# Patient Record
Sex: FEMALE | Race: WHITE | NOT HISPANIC OR LATINO | Employment: OTHER | URBAN - METROPOLITAN AREA
[De-identification: names, ages, dates, MRNs, and addresses within clinical notes are randomized per-mention and may not be internally consistent; named-entity substitution may affect disease eponyms.]

---

## 2019-03-23 ENCOUNTER — OFFICE VISIT (OUTPATIENT)
Dept: URGENT CARE | Facility: URGENT CARE | Age: 54
End: 2019-03-23
Payer: COMMERCIAL

## 2019-03-23 VITALS
RESPIRATION RATE: 16 BRPM | WEIGHT: 111 LBS | TEMPERATURE: 98.1 F | HEART RATE: 88 BPM | SYSTOLIC BLOOD PRESSURE: 100 MMHG | DIASTOLIC BLOOD PRESSURE: 66 MMHG

## 2019-03-23 DIAGNOSIS — L03.012 ACUTE PARONYCHIA OF FINGER OF LEFT HAND: Primary | ICD-10-CM

## 2019-03-23 DIAGNOSIS — L02.91 ABSCESS: ICD-10-CM

## 2019-03-23 PROCEDURE — 99203 OFFICE O/P NEW LOW 30 MIN: CPT | Mod: 25 | Performed by: PHYSICIAN ASSISTANT

## 2019-03-23 PROCEDURE — 10060 I&D ABSCESS SIMPLE/SINGLE: CPT | Performed by: PHYSICIAN ASSISTANT

## 2019-03-23 RX ORDER — CEPHALEXIN 500 MG/1
500 CAPSULE ORAL 3 TIMES DAILY
Qty: 30 CAPSULE | Refills: 0 | Status: SHIPPED | OUTPATIENT
Start: 2019-03-23 | End: 2024-07-25

## 2019-03-23 NOTE — PATIENT INSTRUCTIONS
Patient Education     Paronychia of the Finger or Toe  Paronychia is an infection near a fingernail or toenail. It usually occurs when an opening in the cuticle or an ingrown toenail lets bacteria under the skin.  The infection will need to be drained if pus is present. If the infection has been caught early, you may need only antibiotic treatment. Healing will take about 1 to 2 weeks.  Home care  Follow these guidelines when caring for yourself at home:    Clean and soak the toe or finger. Do this 2 times a day for the first 3 days. To do so:  ? Soak your foot or hand in a tub of warm water for 5 minutes. Or hold your toe or finger under a faucet of warm running water for 5 minutes.  ? Clean any crust away with soap and water using a cotton swab.  ? Put antibiotic ointment on the infected area.    Change the dressing daily or any time it gets dirty.    If you were given antibiotics, take them as directed until they are all gone.    If your infection is on a toe, wear comfortable shoes with a lot of toe room. You can also wear open-toed sandals while your toe heals.    You may use over-the-counter medicine (acetaminophen or ibuprofen to help with pain, unless another medicine was prescribed. If you have chronic liver or kidney disease, talk with your healthcare provider before using these medicines. Also talk with your provider if you've had a stomach ulcer or GI (gastrointestinal) bleeding.  Prevention  The following can prevent paronychia:    Avoid cutting or playing with your cuticles at home.    Don't bite your nails.    Don't suck on your thumbs or fingers.  Follow-up care  Follow up with your healthcare provider, or as advised.  When to seek medical advice  Call your healthcare provider right away if any of these occur:    Redness, pain, or swelling of the finger or toe gets worse    Red streaks in the skin leading away from the wound    Pus or fluid draining from the nail area    Fever of 100.4 F (38 C) or  higher, or as directed by your provider  Date Last Reviewed: 8/1/2016 2000-2018 [employer]. [address] [place] [address], [place] [address]. All rights reserved. This information is not intended as a substitute for professional medical care. Always follow your healthcare professional's instructions.

## 2019-03-28 NOTE — PROGRESS NOTES
SUBJECTIVE:  Chief Complaint   Patient presents with     Finger     rt 3rd finger injury aprox 10 days ago,still with pain     Nora Vargas is a 53 year old female presents with a chief complaint of left finger  third swelling, tenderness and redness.  How: no known injury.  The patient complained of mild pain  and has not had decreased ROM.  Pain exacerbated by movement.  Relieved by rest.  She treated it initially with no therapy. This is the first time this type of injury has occurred to this patient.     No past medical history on file.  No Known Allergies  Social History     Tobacco Use     Smoking status: Never Smoker     Smokeless tobacco: Never Used   Substance Use Topics     Alcohol use: Not on file       ROS:  CONSTITUTIONAL:NEGATIVE for fever, chills, change in weight  INTEGUMENTARY/SKIN: POSITIVE for right 3rd finger swelling, tenderness  RESP:NEGATIVE for significant cough or SOB  CV: NEGATIVE for chest pain, palpitations or peripheral edema  MUSCULOSKELETAL: POSITIVE for right 3rd finger tenderness, localized swelling  VASC: NEGATIVE for localized swelling, localized puss pocket  NEURO: NEGATIVE for weakness, dizziness or paresthesias    EXAM:   /66 (Cuff Size: Adult Regular)   Pulse 88   Temp 98.1  F (36.7  C) (Oral)   Resp 16   Wt 50.3 kg (111 lb)   Gen: healthy,alert,no distress  Extremity: finger has localized swelling and tenderness.   There is not compromise to the distal circulation.  Pulses are +2 and CRT is brisk  EXTREMITIES: peripheral pulses normal  MS:  Positive for finger tenderness  SKIN: Positive for localized puss pocket  NEURO: Normal strength and tone, sensory exam grossly normal, mentation intact and speech normal    ASSESSMENT/PLAN      ICD-10-CM    1. Acute paronychia of finger of left hand L03.012 cephALEXin (KEFLEX) 500 MG capsule     DRAIN SKIN ABSCESS SIMPLE/SINGLE   2. Abscess L02.91      Warm finger soaks  Motrin  Keflex for infection    PROCEDURE:  11 blade used  to make incision into finger  Drainage of puss  Pt tolerated procedure well

## 2021-05-26 ENCOUNTER — RECORDS - HEALTHEAST (OUTPATIENT)
Dept: ADMINISTRATIVE | Facility: CLINIC | Age: 56
End: 2021-05-26

## 2022-03-01 ENCOUNTER — TRANSFERRED RECORDS (OUTPATIENT)
Dept: MULTI SPECIALTY CLINIC | Facility: CLINIC | Age: 57
End: 2022-03-01

## 2022-03-01 LAB — PAP SMEAR - HIM PATIENT REPORTED: NORMAL

## 2024-07-24 ENCOUNTER — TELEPHONE (OUTPATIENT)
Dept: FAMILY MEDICINE | Facility: CLINIC | Age: 59
End: 2024-07-24

## 2024-07-24 ENCOUNTER — OFFICE VISIT (OUTPATIENT)
Dept: FAMILY MEDICINE | Facility: CLINIC | Age: 59
End: 2024-07-24
Payer: COMMERCIAL

## 2024-07-24 VITALS
DIASTOLIC BLOOD PRESSURE: 74 MMHG | TEMPERATURE: 97.8 F | OXYGEN SATURATION: 97 % | HEART RATE: 70 BPM | RESPIRATION RATE: 15 BRPM | SYSTOLIC BLOOD PRESSURE: 110 MMHG

## 2024-07-24 DIAGNOSIS — N20.0 KIDNEY STONE: ICD-10-CM

## 2024-07-24 DIAGNOSIS — G47.09 OTHER INSOMNIA: Primary | ICD-10-CM

## 2024-07-24 DIAGNOSIS — K63.8219 SMALL INTESTINAL BACTERIAL OVERGROWTH (SIBO): ICD-10-CM

## 2024-07-24 DIAGNOSIS — M81.8 OTHER OSTEOPOROSIS WITHOUT CURRENT PATHOLOGICAL FRACTURE: ICD-10-CM

## 2024-07-24 DIAGNOSIS — Z99.3 WHEELCHAIR DEPENDENT: ICD-10-CM

## 2024-07-24 DIAGNOSIS — E03.9 HYPOTHYROIDISM, UNSPECIFIED TYPE: ICD-10-CM

## 2024-07-24 DIAGNOSIS — G93.32 CHRONIC FATIGUE SYNDROME: ICD-10-CM

## 2024-07-24 LAB
T3FREE SERPL-MCNC: 2.4 PG/ML (ref 2–4.4)
T4 FREE SERPL-MCNC: 1.52 NG/DL (ref 0.9–1.7)
TSH SERPL DL<=0.005 MIU/L-ACNC: 3.38 UIU/ML (ref 0.3–4.2)

## 2024-07-24 PROCEDURE — 84481 FREE ASSAY (FT-3): CPT | Performed by: PHYSICIAN ASSISTANT

## 2024-07-24 PROCEDURE — 84439 ASSAY OF FREE THYROXINE: CPT | Performed by: PHYSICIAN ASSISTANT

## 2024-07-24 PROCEDURE — 90471 IMMUNIZATION ADMIN: CPT | Performed by: PHYSICIAN ASSISTANT

## 2024-07-24 PROCEDURE — 90715 TDAP VACCINE 7 YRS/> IM: CPT | Performed by: PHYSICIAN ASSISTANT

## 2024-07-24 PROCEDURE — 99204 OFFICE O/P NEW MOD 45 MIN: CPT | Mod: 25 | Performed by: PHYSICIAN ASSISTANT

## 2024-07-24 PROCEDURE — 36415 COLL VENOUS BLD VENIPUNCTURE: CPT | Performed by: PHYSICIAN ASSISTANT

## 2024-07-24 PROCEDURE — 84443 ASSAY THYROID STIM HORMONE: CPT | Performed by: PHYSICIAN ASSISTANT

## 2024-07-24 RX ORDER — LEVOTHYROXINE SODIUM 50 UG/1
TABLET ORAL
Qty: 90 TABLET | Refills: 3 | Status: SHIPPED | OUTPATIENT
Start: 2024-07-24

## 2024-07-24 RX ORDER — TRAZODONE HYDROCHLORIDE 50 MG/1
150 TABLET, FILM COATED ORAL AT BEDTIME
Qty: 270 TABLET | Refills: 1 | Status: SHIPPED | OUTPATIENT
Start: 2024-07-24 | End: 2024-07-29

## 2024-07-24 ASSESSMENT — PAIN SCALES - GENERAL: PAINLEVEL: NO PAIN (0)

## 2024-07-24 NOTE — PROGRESS NOTES
Assessment & Plan     (G47.09) Other insomnia  (primary encounter diagnosis)  Comment: refilled.   Plan: traZODone (DESYREL) 50 MG tablet            (E03.9) Hypothyroidism, unspecified type  Comment: await labs. Referral placed.   Plan: TSH, T4, free, T3, Free, levothyroxine         (SYNTHROID/LEVOTHROID) 50 MCG tablet, Adult         Endocrinology  Referral            (M81.8) Other osteoporosis without current pathological fracture  Comment: referral placed.   Plan: Adult Endocrinology  Referral, Primary        Care - Care Coordination Referral            (K63.8219) Small intestinal bacterial overgrowth (SIBO)  Comment: referral to [facility] will be faxed with OV notes. Needs to establish care.   Plan: Adult GI  Referral - Consult Only            (N20.0) Kidney stone  Comment: referral placed. No symptoms.   Plan: Adult Urology  Referral            (Z99.3) Wheelchair dependent  Comment: CCSW referral for resources.  pt may drop off form for [employer] as well with Noreen Galdamez PA-C   Form for disabled parking given. pt does not drive.   Plan: Primary Care - Care Coordination Referral            (G93.32) Chronic fatigue syndrome  Comment:   Plan: will continue care with Dr. Momo Almazan   Nora is a 58 year old, presenting for the following health issues:  Medication Request        7/24/2024     7:49 AM   Additional Questions   Roomed by Francisca Benjamin     Via the Health Maintenance questionnaire, the patient has reported the following services have been completed -Cervical Cancer Screening: Mony SUNG 2022-03-01, this information has been sent to the abstraction team.  History of Present Illness       Reason for visit:  New patient    She eats 4 or more servings of fruits and vegetables daily.She consumes 0 sweetened beverage(s) daily.She exercises with enough effort to increase her heart rate 9 or less minutes per day.  She exercises with enough effort to  increase her heart rate 3 or less days per week. She is missing 2 dose(s) of medications per week.       Patient is here to establish care.  She has recently moved from [place] back to [place].  She is currently under the care of Dr. Barr, who is a chronic fatigue syndrome specialist.    Patient was seeing endocrinology in [place] for her hypothyroidism as well as osteoporosis management.  She has history of using Reclast with good results.  Request referral today for endocrinology.    Patient also still has a history of small intestine bacterial overgrowth she has been treated by gastroenterology in the past but needs to establish care with new gastroenterologist.  She is currently taking famotidine 20 mg twice daily OTC.    Patient also has a history of kidney stones is taking potassium citrate for this.  Needs to establish care with new urologist.  Requesting referral today.    Patient has a history of ongoing insomnia she uses trazodone with good results.  She is requesting refill of this today.    Patient is wheelchair dependent due to severe osteoporosis and chronic fatigue syndrome.  She is requesting updated disabled parking placard for car.  She may also need to help getting set up with [employer] and looking for other transportation resources.    Hypothyroidism Follow-up    Since last visit, patient describes the following symptoms: Weight stable, no hair loss, no skin changes, no constipation, no loose stools, dry skin, tremors, and fatigue            Review of Systems  Constitutional, HEENT, cardiovascular, pulmonary, gi and gu systems are negative, except as otherwise noted.      Objective    /74 (BP Location: Right arm, Patient Position: Sitting, Cuff Size: Adult Regular)   Pulse 70   Temp 97.8  F (36.6  C) (Oral)   Resp 15   LMP  (LMP Unknown)   SpO2 97%   Breastfeeding No   There is no height or weight on file to calculate BMI.  Physical Exam     GENERAL: alert and no  distress  RESP: lungs clear to auscultation - no rales, rhonchi or wheezes  CV: regular rate and rhythm, normal S1 S2, no S3 or S4, no murmur, click or rub, no peripheral edema   PSYCH: mentation appears normal, affect normal/bright            Signed Electronically by: Noreen Galdamez PA-C

## 2024-07-25 ENCOUNTER — TELEPHONE (OUTPATIENT)
Dept: FAMILY MEDICINE | Facility: CLINIC | Age: 59
End: 2024-07-25
Payer: COMMERCIAL

## 2024-07-25 PROBLEM — D50.9 IRON DEFICIENCY ANEMIA: Status: ACTIVE | Noted: 2020-06-12

## 2024-07-25 PROBLEM — E03.9 HYPOTHYROIDISM, UNSPECIFIED TYPE: Status: ACTIVE | Noted: 2024-07-25

## 2024-07-25 PROBLEM — G93.32 CHRONIC FATIGUE SYNDROME: Status: ACTIVE | Noted: 2022-04-25

## 2024-07-25 PROBLEM — G47.00 INSOMNIA: Status: ACTIVE | Noted: 2022-07-14

## 2024-07-25 PROBLEM — E53.8 COBALAMIN DEFICIENCY: Status: ACTIVE | Noted: 2021-02-18

## 2024-07-25 PROBLEM — E78.1 HYPERTRIGLYCERIDEMIA: Status: ACTIVE | Noted: 2022-11-05

## 2024-07-25 PROBLEM — J45.909 ASTHMA: Status: ACTIVE | Noted: 2022-12-05

## 2024-07-25 PROBLEM — M62.81 MUSCLE WEAKNESS: Status: ACTIVE | Noted: 2023-04-20

## 2024-07-25 PROBLEM — E06.3 HASHIMOTO'S THYROIDITIS: Status: ACTIVE | Noted: 2022-07-14

## 2024-07-25 PROBLEM — R14.0 ABDOMINAL BLOATING: Status: ACTIVE | Noted: 2018-02-14

## 2024-07-25 PROBLEM — D89.40 MAST CELL ACTIVATION SYNDROME (H): Status: ACTIVE | Noted: 2024-07-25

## 2024-07-25 PROBLEM — R76.8 AUTOANTIBODY TITER POSITIVE: Status: ACTIVE | Noted: 2018-10-19

## 2024-07-25 PROBLEM — K63.8219 SMALL INTESTINAL BACTERIAL OVERGROWTH (SIBO): Status: ACTIVE | Noted: 2024-07-25

## 2024-07-25 PROBLEM — M81.8 OTHER OSTEOPOROSIS WITHOUT CURRENT PATHOLOGICAL FRACTURE: Status: ACTIVE | Noted: 2024-07-25

## 2024-07-25 PROBLEM — K57.90 DIVERTICULAR DISEASE: Status: ACTIVE | Noted: 2017-12-07

## 2024-07-25 PROBLEM — E55.9 VITAMIN D DEFICIENCY: Status: ACTIVE | Noted: 2021-02-18

## 2024-07-25 PROBLEM — N20.0 KIDNEY STONE: Status: ACTIVE | Noted: 2017-12-20

## 2024-07-25 PROBLEM — Z99.3 WHEELCHAIR DEPENDENT: Status: ACTIVE | Noted: 2024-07-25

## 2024-07-25 RX ORDER — ZOLEDRONIC ACID 5 MG/100ML
5 INJECTION, SOLUTION INTRAVENOUS
COMMUNITY
Start: 2024-07-25

## 2024-07-25 RX ORDER — ALBUTEROL SULFATE 90 UG/1
1-2 AEROSOL, METERED RESPIRATORY (INHALATION) EVERY 4 HOURS PRN
COMMUNITY
Start: 2024-07-25

## 2024-07-25 RX ORDER — MILK THISTLE 150 MG
CAPSULE ORAL
COMMUNITY

## 2024-07-25 RX ORDER — POTASSIUM CITRATE 10 MEQ/1
2 TABLET, EXTENDED RELEASE ORAL 2 TIMES DAILY
COMMUNITY
End: 2024-09-10

## 2024-07-25 RX ORDER — TANNIC/CHESTNUT/PEPPERMINT 275 MG
CAPSULE ORAL
COMMUNITY
Start: 2024-07-25

## 2024-07-25 RX ORDER — FAMOTIDINE 20 MG/1
20 TABLET, FILM COATED ORAL 2 TIMES DAILY
COMMUNITY
Start: 2024-07-25

## 2024-07-25 RX ORDER — BUDESONIDE AND FORMOTEROL FUMARATE DIHYDRATE 160; 4.5 UG/1; UG/1
2 AEROSOL RESPIRATORY (INHALATION)
COMMUNITY
Start: 2024-07-25 | End: 2024-08-14

## 2024-07-25 NOTE — TELEPHONE ENCOUNTER
Incoming call from patient. Notified Patient of Noreen Galdamez PA-C's message: Your thyroid levels were normal.     Person was given an opportunity to ask questions, verbalized understanding of plan, and is agreeable.    Odilia Lau RN on 7/25/2024 at 10:00 AM

## 2024-07-25 NOTE — TELEPHONE ENCOUNTER
----- Message from Noreen Galdamez sent at 7/25/2024  9:00 AM CDT -----  Nora,    Your thyroid levels were normal.     Take Care,   Noreen Galdamez PA-C

## 2024-07-25 NOTE — TELEPHONE ENCOUNTER
Attempt x 1. Called pt and left a message to call back to [phone] and to ask to speak to a triage nurse.     When pt calls back,     Relay message from Noreen Galdamez PA-C below.     Isha ALSTON RN   [facility]

## 2024-07-26 ENCOUNTER — PATIENT OUTREACH (OUTPATIENT)
Dept: CARE COORDINATION | Facility: CLINIC | Age: 59
End: 2024-07-26
Payer: COMMERCIAL

## 2024-07-26 NOTE — PROGRESS NOTES
Clinic Care Coordination Contact  Community Health Worker Initial Outreach    CHW introduced self and role with CC  Patient states that her PCP did complete the handicap parking form already, patient feels confident with complete [employer] application on her own. Will drop them off at the clinic when her section is completed.  CHW inquired if patient is in need of any additional support or resources however patient declines.  CHW provided contact information and encouraged patient to reach out with any future needs or concerns, patient agrees.    Patient accepts CC: No, patient declines ongoing needs from CC at this time. Patient will be sent Care Coordination introduction letter for future reference.     MARCELLA James, B.S. [facility] Care Coordination  [facility]:  [place], [place] and [place]  [phone]  [email or web address]

## 2024-07-26 NOTE — LETTER
[facility] CARE COORDINATION  [id number]  [facility] [address]    July 26, 2024    Nora Vargas  [address] [place] [address]      Dear Nora,    I am a clinic community health worker who works with Noreen Galdamez PA-C with the [facility]. I wanted to thank you for spending the time to talk with me.  Below is a description of clinic care coordination and how I can further assist you.       The clinic care coordination team is made up of a registered nurse, , financial resource worker and community health worker who understand the health care system. The goal of clinic care coordination is to help you manage your health and improve access to the health care system. Our team works alongside your provider to assist you in determining your health and social needs. We can help you obtain health care and community resources, providing you with necessary information and education. We can work with you through any barriers and develop a care plan that helps coordinate and strengthen the communication between you and your care team.  Our services are voluntary and are offered without charge to you personally.    Please feel free to contact me with any questions or concerns regarding care coordination and what we can offer.      We are focused on providing you with the highest-quality healthcare experience possible.    Sincerely,     Rosario Templeton, MARCELLA, B.S. [facility]  Clinic Care Coordination  [facility]:  [place], [place] and [place]  [phone]  [email or web address]

## 2024-07-28 NOTE — TELEPHONE ENCOUNTER
PA Initiation    Medication: TRAZODONE HCL 50 MG PO TABS  Insurance Company:    Pharmacy Filling the Rx: [id number] - [place], [place] - [address]  Filling Pharmacy Phone: [phone]  Filling Pharmacy Fax:    Start Date: 7/28/2024    SENDING BACK TO CLINIC - Is patient able to take the 150 mg capsule of Trazodone? If so please send new rx to pharmacy. If not please route this back to me so I can complete PA request thanks.     Oriana Plata Prior Authorization Team  P: [phone]

## 2024-07-29 RX ORDER — TRAZODONE HYDROCHLORIDE 150 MG/1
150 TABLET ORAL AT BEDTIME
Qty: 90 TABLET | Refills: 1 | Status: SHIPPED | OUTPATIENT
Start: 2024-07-29

## 2024-07-29 NOTE — TELEPHONE ENCOUNTER
See below.  Please advise.  Manisha Oh, RN       SENDING BACK TO CLINIC - Is patient able to take the 150 mg capsule of Trazodone? If so please send new rx to pharmacy. If not please route this back to me so I can complete PA request thanks.

## 2024-07-29 NOTE — TELEPHONE ENCOUNTER
Trazodone 150 mg tablets sent for patient (adjusted from three 50 mg tablets to just the one nightly). Please let her know.

## 2024-07-29 NOTE — TELEPHONE ENCOUNTER
RN spoke to patient     Advised new dose of trazodone 150 mg sent   This will replace the 50 mg - take 3 tablets at bedtime to equal 150 mg   RN reminded patient to take only 1 tablet of the 150 mg dose NOT 3 tablets as previously taking, patient states understanding     Isabelle Melo, Registered Nurse  [facility]

## 2024-07-30 ENCOUNTER — TRANSFERRED RECORDS (OUTPATIENT)
Dept: HEALTH INFORMATION MANAGEMENT | Facility: CLINIC | Age: 59
End: 2024-07-30
Payer: COMMERCIAL

## 2024-08-05 NOTE — TELEPHONE ENCOUNTER
Prior Authorization Not Needed per Insurance    Medication: TRAZODONE HCL 50 MG PO TABS  Insurance Company: Comment:  NAVYA  Expected CoPay: $    Pharmacy Filling the Rx: [id number] - [place], [place] - [address] [facility]  Pharmacy Notified: NO  Patient Notified: Per review by clinic therapy has been changed to covered strength/dosage. No further action required.

## 2024-08-14 ENCOUNTER — OFFICE VISIT (OUTPATIENT)
Dept: UROLOGY | Facility: CLINIC | Age: 59
End: 2024-08-14
Payer: COMMERCIAL

## 2024-08-14 VITALS
SYSTOLIC BLOOD PRESSURE: 111 MMHG | WEIGHT: 105 LBS | BODY MASS INDEX: 21.17 KG/M2 | OXYGEN SATURATION: 96 % | HEART RATE: 77 BPM | HEIGHT: 59 IN | DIASTOLIC BLOOD PRESSURE: 75 MMHG

## 2024-08-14 DIAGNOSIS — N20.0 KIDNEY STONE: Primary | ICD-10-CM

## 2024-08-14 PROBLEM — N30.91 HEMORRHAGIC CYSTITIS: Status: ACTIVE | Noted: 2020-04-20

## 2024-08-14 PROBLEM — Z87.442 HISTORY OF RENAL CALCULI: Status: ACTIVE | Noted: 2019-06-24

## 2024-08-14 PROBLEM — R10.30 LOWER ABDOMINAL PAIN: Status: ACTIVE | Noted: 2024-08-14

## 2024-08-14 LAB
ALBUMIN UR-MCNC: NEGATIVE MG/DL
APPEARANCE UR: CLEAR
BILIRUB UR QL STRIP: NEGATIVE
COLOR UR AUTO: YELLOW
GLUCOSE UR STRIP-MCNC: NEGATIVE MG/DL
HGB UR QL STRIP: NEGATIVE
KETONES UR STRIP-MCNC: NEGATIVE MG/DL
LEUKOCYTE ESTERASE UR QL STRIP: ABNORMAL
NITRATE UR QL: NEGATIVE
PH UR STRIP: 7 [PH] (ref 5–7)
SP GR UR STRIP: 1.01 (ref 1–1.03)
UROBILINOGEN UR STRIP-ACNC: 0.2 E.U./DL

## 2024-08-14 PROCEDURE — 81003 URINALYSIS AUTO W/O SCOPE: CPT | Mod: QW | Performed by: NURSE PRACTITIONER

## 2024-08-14 PROCEDURE — 99204 OFFICE O/P NEW MOD 45 MIN: CPT | Performed by: NURSE PRACTITIONER

## 2024-08-14 RX ORDER — TAMSULOSIN HYDROCHLORIDE 0.4 MG/1
0.4 CAPSULE ORAL DAILY
Qty: 30 CAPSULE | Refills: 0 | Status: ON HOLD | OUTPATIENT
Start: 2024-08-14 | End: 2024-09-18

## 2024-08-14 RX ORDER — FEXOFENADINE HCL 180 MG/1
1 TABLET ORAL DAILY
COMMUNITY

## 2024-08-14 RX ORDER — CETIRIZINE HYDROCHLORIDE 10 MG/1
10 TABLET ORAL DAILY
COMMUNITY

## 2024-08-14 RX ORDER — BUDESONIDE AND FORMOTEROL FUMARATE DIHYDRATE 160; 4.5 UG/1; UG/1
2 AEROSOL RESPIRATORY (INHALATION)
COMMUNITY

## 2024-08-14 RX ORDER — COLESEVELAM HYDROCHLORIDE 625 MG/1
625 TABLET, FILM COATED ORAL EVERY 24 HOURS
COMMUNITY
Start: 2024-01-20

## 2024-08-14 ASSESSMENT — PAIN SCALES - GENERAL: PAINLEVEL: MILD PAIN (2)

## 2024-08-14 NOTE — NURSING NOTE
Chief Complaint   Patient presents with    Kidney Stone      Patient had imaging in May-She started having pain for 4-5 days ago- intermittent pain in flank and side of both sides of body     Ghada Boyle LPN

## 2024-08-14 NOTE — PROGRESS NOTES
Urology Outpatient Visit    Name: Nora Vargas    MRN: [id number]   YOB: 1965               Chief Complaint:   Nephrolithiasis         Impression and Plan:   Impression / Plan:   Nora Vargas is a 58 year old female with nonobstructing bilateral nephrolithiasis.       -Discussed active surveillance vs procedural intervention with patient. Pros/cons discussed.   -Plan for ureteroscopy, laser lithotripsy, and ureteral stent placement to be scheduled with Dr. Erazo.  - Risks including need for secondary procedure, incomplete stone clearance, ureteral or bladder injury, hematuria, stent pain and irritation were discussed.  - Emergency return precautions discussed including fevers, chills, diaphoresis, uncontrollable N/V, high grade gross hematuria, severe pain that is refractory to medications. Patient understands to proceed to the ER with development of any of these symptoms.     Thank you for the opportunity to participate in the care of Nora Vargas.     DAMARIS Santana, CNP  [facility] - Department of Urology  [phone]          History of Present Illness:   Nora Vargas is a 58 year old female with history of nephrolithiasis, previously underwent ESWL & URS with Dr. Moise of [facility]. Over the last several days prior to presenting to clinic the patient developed intermittent pain in the flank and side of both sides of body. Afebrile. No blood on UA today. Nora tries to stay hydrated and tries to avoid high oxalate foods. CT was ordered which revealed at least 6 stones on each side, with the largest on the L measuring 8 mm & the largest on the R measuring 5 mm. CT showed no obstructing or ureteral stones.    History is obtained from patient & EMR    Pertinent imaging reviewed:  CT ABDOMEN PELVIS WITHOUT CONTRAST 8/23/2024 3:43 PM   IMPRESSION:   1.  Multiple bilateral nonobstructing stones measuring up to 8 mm on  the left          Past Medical History:   History  reviewed. No pertinent past medical history.       Past Surgical History:     Past Surgical History:   Procedure Laterality Date    CYSTOSCOPY            Social History:     Social History     Tobacco Use    Smoking status: Never    Smokeless tobacco: Never   Substance Use Topics    Alcohol use: Not Currently          Family History:   History reviewed. No pertinent family history.       Allergies:   No Known Allergies       Medications:     Current Outpatient Medications   Medication Sig Dispense Refill    albuterol (PROAIR HFA/PROVENTIL HFA/VENTOLIN HFA) 108 (90 Base) MCG/ACT inhaler Inhale 1-2 puffs into the lungs every 4 hours as needed      budesonide-formoterol (BREYNA) 160-4.5 MCG/ACT Inhaler Inhale 2 puffs into the lungs two times daily      cetirizine (ZYRTEC) 10 MG tablet Take 10 mg by mouth daily      cholecalciferol 125 MCG (5000 UT) CAPS       Cobalamin Combinations (B12 FOLATE) 800-800 MCG CAPS       CROMOLYN SODIUM 10 mg 4 times daily      famotidine (PEPCID) 20 MG tablet Take 1 tablet (20 mg) by mouth 2 times daily      levothyroxine (SYNTHROID/LEVOTHROID) 50 MCG tablet Take 1 tablet daily, skip Sundays 90 tablet 3    LIOTHYRONINE SODIUM PO Take 7.5 mcg by mouth daily Skip Sundays--gets from compounding pharmacy      [facility] (ATRANTIL) CAPS       naltrexone 1.5 MG capsule Take 9 mg by mouth at bedtime      potassium citrate (UROCIT-K) 10 MEQ (1080 MG) CR tablet Take 2 tablets by mouth 2 times daily      Quercetin 500 MG CAPS 800 mg by oral route.      traZODone (DESYREL) 150 MG tablet Take 1 tablet (150 mg) by mouth at bedtime 90 tablet 1    WELCHOL 625 MG tablet Take 625 mg by mouth every 24 hours      zoledronic acid (RECLAST) 5 MG/100ML SOLN infusion Inject 100 mLs (5 mg) into the vein      fexofenadine (ALLEGRA ALLERGY) 180 MG tablet Take 1 tablet by mouth daily      Multiple Vitamin (MULTIVITAMIN ADULT PO) Take 1 tablet by mouth daily       No current facility-administered  "medications for this visit.          Review of Systems:    ROS: See HPI for pertinent details.  Remainder of 10-point ROS negative.         Physical Exam:   VS:  T: Data Unavailable    HR: 77    BP: 111/75    RR: Data Unavailable     GEN:  Alert.  NAD.   HEENT:  Sclerae anicteric.    CV:  No obvious jugular venous distension.  LUNGS: No respiratory distress, breathing comfortably wo accessory muscle use.  ABD:  ND.     SKIN:  Dry. No visible rashes.   NEURO:  CN grossly intact.          Laboratory Data:   No results found for: \"PSA\"  No results found for: \"CR\"  No results found for: \"GFRESTIMATED\"     "

## 2024-08-14 NOTE — LETTER
8/14/2024       RE: Nora Vargas  [address]  [place] [address]     Dear Colleague,    Thank you for referring your patient, Nora Vargas, to the [facility]. Please see a copy of my visit note below.      Urology Outpatient Visit    Name: Nora Vargas    MRN: [id number]   YOB: 1965               Chief Complaint:   Nephrolithiasis         Impression and Plan:   Impression / Plan:   Nora Vargas is a 58 year old female with nonobstructing bilateral nephrolithiasis.       -Discussed active surveillance vs procedural intervention with patient. Pros/cons discussed.   -Plan for ureteroscopy, laser lithotripsy, and ureteral stent placement to be scheduled with Dr. Erazo.  - Risks including need for secondary procedure, incomplete stone clearance, ureteral or bladder injury, hematuria, stent pain and irritation were discussed.  - Emergency return precautions discussed including fevers, chills, diaphoresis, uncontrollable N/V, high grade gross hematuria, severe pain that is refractory to medications. Patient understands to proceed to the ER with development of any of these symptoms.     Thank you for the opportunity to participate in the care of Nora Vargas.     DAMARIS Santana, CNP   [facility] - Department of Urology  [phone]          History of Present Illness:   Nora Vargas is a 58 year old female with history of nephrolithiasis, previously underwent ESWL & URS with Dr. Moise of [facility]. Over the last several days prior to presenting to clinic the patient developed intermittent pain in the flank and side of both sides of body. Afebrile. No blood on UA today. Nora tries to stay hydrated and tries to avoid high oxalate foods. CT was ordered which revealed at least 6 stones on each side, with the largest on the L measuring 8 mm & the largest on the R measuring 5 mm. CT showed no  obstructing or ureteral stones.    History is obtained from patient & EMR    Pertinent imaging reviewed:  CT ABDOMEN PELVIS WITHOUT CONTRAST 8/23/2024 3:43 PM   IMPRESSION:   1.  Multiple bilateral nonobstructing stones measuring up to 8 mm on  the left          Past Medical History:   History reviewed. No pertinent past medical history.       Past Surgical History:     Past Surgical History:   Procedure Laterality Date     CYSTOSCOPY            Social History:     Social History     Tobacco Use     Smoking status: Never     Smokeless tobacco: Never   Substance Use Topics     Alcohol use: Not Currently          Family History:   History reviewed. No pertinent family history.       Allergies:   No Known Allergies       Medications:     Current Outpatient Medications   Medication Sig Dispense Refill     albuterol (PROAIR HFA/PROVENTIL HFA/VENTOLIN HFA) 108 (90 Base) MCG/ACT inhaler Inhale 1-2 puffs into the lungs every 4 hours as needed       budesonide-formoterol (BREYNA) 160-4.5 MCG/ACT Inhaler Inhale 2 puffs into the lungs two times daily       cetirizine (ZYRTEC) 10 MG tablet Take 10 mg by mouth daily       cholecalciferol 125 MCG (5000 UT) CAPS        Cobalamin Combinations (B12 FOLATE) 800-800 MCG CAPS        CROMOLYN SODIUM 10 mg 4 times daily       famotidine (PEPCID) 20 MG tablet Take 1 tablet (20 mg) by mouth 2 times daily       levothyroxine (SYNTHROID/LEVOTHROID) 50 MCG tablet Take 1 tablet daily, skip Sundays 90 tablet 3     LIOTHYRONINE SODIUM PO Take 7.5 mcg by mouth daily Skip Sundays--gets from compounding pharmacy       [facility] (ATRANTIL) CAPS        naltrexone 1.5 MG capsule Take 9 mg by mouth at bedtime       potassium citrate (UROCIT-K) 10 MEQ (1080 MG) CR tablet Take 2 tablets by mouth 2 times daily       Quercetin 500 MG CAPS 800 mg by oral route.       traZODone (DESYREL) 150 MG tablet Take 1 tablet (150 mg) by mouth at bedtime 90 tablet 1     WELCHOL 625 MG tablet Take 625 mg by  "mouth every 24 hours       zoledronic acid (RECLAST) 5 MG/100ML SOLN infusion Inject 100 mLs (5 mg) into the vein       fexofenadine (ALLEGRA ALLERGY) 180 MG tablet Take 1 tablet by mouth daily       Multiple Vitamin (MULTIVITAMIN ADULT PO) Take 1 tablet by mouth daily       No current facility-administered medications for this visit.          Review of Systems:    ROS: See HPI for pertinent details.  Remainder of 10-point ROS negative.         Physical Exam:   VS:  T: Data Unavailable    HR: 77    BP: 111/75    RR: Data Unavailable     GEN:  Alert.  NAD.   HEENT:  Sclerae anicteric.    CV:  No obvious jugular venous distension.  LUNGS: No respiratory distress, breathing comfortably wo accessory muscle use.  ABD:  ND.     SKIN:  Dry. No visible rashes.   NEURO:  CN grossly intact.          Laboratory Data:   No results found for: \"PSA\"  No results found for: \"CR\"  No results found for: \"GFRESTIMATED\"         Again, thank you for allowing me to participate in the care of your patient.      Sincerely,    DAMARIS Vargas CNP    "

## 2024-08-23 ENCOUNTER — HOSPITAL ENCOUNTER (OUTPATIENT)
Dept: CT IMAGING | Facility: CLINIC | Age: 59
Discharge: HOME | End: 2024-08-23
Attending: NURSE PRACTITIONER | Admitting: NURSE PRACTITIONER
Payer: COMMERCIAL

## 2024-08-23 DIAGNOSIS — N20.0 KIDNEY STONE: ICD-10-CM

## 2024-08-23 PROCEDURE — 74176 CT ABD & PELVIS W/O CONTRAST: CPT

## 2024-08-29 ENCOUNTER — PREP FOR PROCEDURE (OUTPATIENT)
Dept: UROLOGY | Facility: CLINIC | Age: 59
End: 2024-08-29
Payer: MEDICARE

## 2024-08-29 DIAGNOSIS — N20.0 RIGHT KIDNEY STONE: Primary | ICD-10-CM

## 2024-08-29 DIAGNOSIS — N20.0 CALCULUS OF LEFT KIDNEY: ICD-10-CM

## 2024-09-03 ENCOUNTER — PREP FOR PROCEDURE (OUTPATIENT)
Dept: SURGERY | Facility: CLINIC | Age: 59
End: 2024-09-03
Payer: COMMERCIAL

## 2024-09-03 DIAGNOSIS — N20.0 RIGHT KIDNEY STONE: ICD-10-CM

## 2024-09-03 DIAGNOSIS — N20.0 CALCULUS OF LEFT KIDNEY: Primary | ICD-10-CM

## 2024-09-06 ENCOUNTER — MYC MEDICAL ADVICE (OUTPATIENT)
Dept: FAMILY MEDICINE | Facility: CLINIC | Age: 59
End: 2024-09-06
Payer: COMMERCIAL

## 2024-09-10 ENCOUNTER — DOCUMENTATION ONLY (OUTPATIENT)
Dept: FAMILY MEDICINE | Facility: CLINIC | Age: 59
End: 2024-09-10

## 2024-09-10 ENCOUNTER — OFFICE VISIT (OUTPATIENT)
Dept: FAMILY MEDICINE | Facility: CLINIC | Age: 59
End: 2024-09-10
Payer: COMMERCIAL

## 2024-09-10 VITALS
DIASTOLIC BLOOD PRESSURE: 73 MMHG | SYSTOLIC BLOOD PRESSURE: 108 MMHG | HEIGHT: 59 IN | BODY MASS INDEX: 21.17 KG/M2 | RESPIRATION RATE: 16 BRPM | TEMPERATURE: 98 F | WEIGHT: 105 LBS | HEART RATE: 77 BPM | OXYGEN SATURATION: 98 %

## 2024-09-10 DIAGNOSIS — E78.1 HYPERTRIGLYCERIDEMIA: ICD-10-CM

## 2024-09-10 DIAGNOSIS — N20.0 KIDNEY STONE: ICD-10-CM

## 2024-09-10 DIAGNOSIS — Z01.818 PREOP GENERAL PHYSICAL EXAM: Primary | ICD-10-CM

## 2024-09-10 DIAGNOSIS — D50.9 IRON DEFICIENCY ANEMIA, UNSPECIFIED IRON DEFICIENCY ANEMIA TYPE: ICD-10-CM

## 2024-09-10 DIAGNOSIS — E06.3 HASHIMOTO'S THYROIDITIS: ICD-10-CM

## 2024-09-10 DIAGNOSIS — E87.6 HYPOKALEMIA: ICD-10-CM

## 2024-09-10 DIAGNOSIS — D89.40 MAST CELL ACTIVATION SYNDROME (H): ICD-10-CM

## 2024-09-10 DIAGNOSIS — G93.32 CHRONIC FATIGUE SYNDROME: ICD-10-CM

## 2024-09-10 LAB
ANION GAP SERPL CALCULATED.3IONS-SCNC: 12 MMOL/L (ref 7–15)
BUN SERPL-MCNC: 13 MG/DL (ref 6–20)
CALCIUM SERPL-MCNC: 9.2 MG/DL (ref 8.8–10.4)
CHLORIDE SERPL-SCNC: 104 MMOL/L (ref 98–107)
CREAT SERPL-MCNC: 0.67 MG/DL (ref 0.51–0.95)
EGFRCR SERPLBLD CKD-EPI 2021: >90 ML/MIN/1.73M2
ERYTHROCYTE [DISTWIDTH] IN BLOOD BY AUTOMATED COUNT: 14.3 % (ref 10–15)
FERRITIN SERPL-MCNC: 58 NG/ML (ref 11–328)
GLUCOSE SERPL-MCNC: 76 MG/DL (ref 70–99)
HCO3 SERPL-SCNC: 23 MMOL/L (ref 22–29)
HCT VFR BLD AUTO: 40.7 % (ref 35–47)
HGB BLD-MCNC: 13.3 G/DL (ref 11.7–15.7)
IRON BINDING CAPACITY (ROCHE): 295 UG/DL (ref 240–430)
IRON SATN MFR SERPL: 14 % (ref 15–46)
IRON SERPL-MCNC: 41 UG/DL (ref 37–145)
MCH RBC QN AUTO: 28 PG (ref 26.5–33)
MCHC RBC AUTO-ENTMCNC: 32.7 G/DL (ref 31.5–36.5)
MCV RBC AUTO: 86 FL (ref 78–100)
PLATELET # BLD AUTO: 237 10E3/UL (ref 150–450)
POTASSIUM SERPL-SCNC: 4.2 MMOL/L (ref 3.4–5.3)
RBC # BLD AUTO: 4.75 10E6/UL (ref 3.8–5.2)
SODIUM SERPL-SCNC: 139 MMOL/L (ref 135–145)
WBC # BLD AUTO: 5.9 10E3/UL (ref 4–11)

## 2024-09-10 PROCEDURE — 99214 OFFICE O/P EST MOD 30 MIN: CPT | Performed by: PHYSICIAN ASSISTANT

## 2024-09-10 PROCEDURE — 83550 IRON BINDING TEST: CPT | Performed by: PHYSICIAN ASSISTANT

## 2024-09-10 PROCEDURE — 85027 COMPLETE CBC AUTOMATED: CPT | Performed by: PHYSICIAN ASSISTANT

## 2024-09-10 PROCEDURE — 36415 COLL VENOUS BLD VENIPUNCTURE: CPT | Performed by: PHYSICIAN ASSISTANT

## 2024-09-10 PROCEDURE — 82728 ASSAY OF FERRITIN: CPT | Performed by: PHYSICIAN ASSISTANT

## 2024-09-10 PROCEDURE — 83540 ASSAY OF IRON: CPT | Performed by: PHYSICIAN ASSISTANT

## 2024-09-10 PROCEDURE — 80048 BASIC METABOLIC PNL TOTAL CA: CPT | Performed by: PHYSICIAN ASSISTANT

## 2024-09-10 RX ORDER — POTASSIUM CITRATE 10 MEQ/1
20 TABLET, EXTENDED RELEASE ORAL 2 TIMES DAILY
Qty: 360 TABLET | Refills: 1 | Status: SHIPPED | OUTPATIENT
Start: 2024-09-10

## 2024-09-10 ASSESSMENT — ASTHMA QUESTIONNAIRES
QUESTION_3 LAST FOUR WEEKS HOW OFTEN DID YOUR ASTHMA SYMPTOMS (WHEEZING, COUGHING, SHORTNESS OF BREATH, CHEST TIGHTNESS OR PAIN) WAKE YOU UP AT NIGHT OR EARLIER THAN USUAL IN THE MORNING: ONCE OR TWICE
ACT_TOTALSCORE: 20
QUESTION_4 LAST FOUR WEEKS HOW OFTEN HAVE YOU USED YOUR RESCUE INHALER OR NEBULIZER MEDICATION (SUCH AS ALBUTEROL): ONCE A WEEK OR LESS
QUESTION_2 LAST FOUR WEEKS HOW OFTEN HAVE YOU HAD SHORTNESS OF BREATH: ONCE OR TWICE A WEEK
QUESTION_1 LAST FOUR WEEKS HOW MUCH OF THE TIME DID YOUR ASTHMA KEEP YOU FROM GETTING AS MUCH DONE AT WORK, SCHOOL OR AT HOME: NONE OF THE TIME
QUESTION_5 LAST FOUR WEEKS HOW WOULD YOU RATE YOUR ASTHMA CONTROL: SOMEWHAT CONTROLLED
ACT_TOTALSCORE: 20

## 2024-09-10 NOTE — PROGRESS NOTES
Preoperative Evaluation  [facility]  [address] [address] [address]  Phone: [phone]  Primary Provider: Noreen Galdamez PA-C  Pre-op Performing Provider: Amparo Aranda PA-C  Sep 10, 2024             9/10/2024   Surgical Information   What procedure is being done? cystoscopy for kidney stones   Facility or Hospital where procedure/surgery will be performed: [facility]   Who is doing the procedure / surgery? Dr. Glenn Gaona   Date of surgery / procedure: Sept.18 and Oct.4   Time of surgery / procedure: 10:30am   Where do you plan to recover after surgery? at home with family        Fax number for surgical facility: Note does not need to be faxed, will be available electronically in Epic.    Assessment & Plan     The proposed surgical procedure is considered INTERMEDIATE risk.    Preop general physical exam  Patient is optimized for planned procedure.  - CBC with platelets; Future  - Basic metabolic panel  (Ca, Cl, CO2, Creat, Gluc, K, Na, BUN); Future  - CBC with platelets  - Basic metabolic panel  (Ca, Cl, CO2, Creat, Gluc, K, Na, BUN)    Kidney stone  Reason for planned procedure.    Chronic fatigue syndrome  Sees specialist Dr. Barr.  Patient uses wheelchair when outside the house as she tires easily.    Iron deficiency anemia, unspecified iron deficiency anemia type  Recheck iron levels today along with CBC.  - Iron and iron binding capacity; Future  - Ferritin; Future  - Ferritin  - Iron and iron binding capacity    Hypertriglyceridemia  On Welchol for this.    Hypokalemia  Recheck potassium today.  - potassium citrate (UROCIT-K) 10 MEQ (1080 MG) CR tablet; Take 2 tablets (20 mEq) by mouth 2 times daily.    Hashimoto's thyroiditis  On liothyronine/levothyroxine. Has upcoming visit with endocrinology.    Mast cell activation syndrome (H24)  Sees specialist Dr. Barr. Stable      Possible Sleep Apnea: Has chronic fatigue         - No identified  additional risk factors other than previously addressed    Preoperative Medication Instructions  Antiplatelet or Anticoagulation Medication Instructions   - Patient is on no antiplatelet or anticoagulation medications.    Additional Medication Instructions  Take Welchol, naltrexone, Allegra and trazodone as prescribed night before   - Herbal medications and vitamins: DO NOT TAKE 14 days prior to surgery.   - LABA, inhaled corticosteroid, long-acting anticholinergics: Continue without modification.   - rescue Inhaler: Continue PRN. Bring to hospital on the day of surgery.   - postassium: DO NOT TAKE day of surgery.    Recommendation  Approval given to proceed with proposed procedure, without further diagnostic evaluation.    Tha Melendez is a 58 year old, presenting for the following:  Pre-Op Exam (Pre-op appt for surgery)          9/10/2024     1:19 PM   Additional Questions   Roomed by kristen bone   Accompanied by  -liat     CESAR related to upcoming procedure: Removing kidney stones        9/10/2024   Pre-Op Questionnaire   Have you ever had a heart attack or stroke? No   Have you ever had surgery on your heart or blood vessels, such as a stent placement, a coronary artery bypass, or surgery on an artery in your head, neck, heart, or legs? No   Do you have chest pain with activity? No   Do you have a history of heart failure? No   Do you currently have a cold, bronchitis or symptoms of other infection? No   Do you have a cough, shortness of breath, or wheezing? No- has asthma well controlled   Do you or anyone in your family have previous history of blood clots? No   Do you or does anyone in your family have a serious bleeding problem such as prolonged bleeding following surgeries or cuts? No   Have you ever had problems with anemia or been told to take iron pills? (!) YES currently takes iron pills   Have you had any abnormal blood loss such as black, tarry or bloody stools, or abnormal vaginal  bleeding? No   Have you ever had a blood transfusion? No   Are you willing to have a blood transfusion if it is medically needed before, during, or after your surgery? Yes   Have you or any of your relatives ever had problems with anesthesia? No   Do you have sleep apnea, excessive snoring or daytime drowsiness? (!) YES (drowsiness)   Do you have a CPAP machine? (!) NO does not have sleep apnea   Do you have any artifical heart valves or other implanted medical devices like a pacemaker, defibrillator, or continuous glucose monitor? No   Do you have artificial joints? No   Are you allergic to latex? No        Health Care Directive  Patient does not have a Health Care Directive or Living Will: Patient states has Advance Directive and will bring in a copy to clinic.    Preoperative Review of    reviewed - no record of controlled substances prescribed.      Status of Chronic Conditions:  See problem list for active medical problems.  Problems all longstanding and stable, except as noted/documented.  See ROS for pertinent symptoms related to these conditions.    Patient Active Problem List    Diagnosis Date Noted    Lower abdominal pain 08/14/2024     Priority: Medium    Hypothyroidism, unspecified type 07/25/2024     Priority: Medium    Mast cell activation syndrome (H24) 07/25/2024     Priority: Medium    Small intestinal bacterial overgrowth (SIBO) 07/25/2024     Priority: Medium    Other osteoporosis without current pathological fracture 07/25/2024     Priority: Medium    Wheelchair dependent 07/25/2024     Priority: Medium    Muscle weakness 04/20/2023     Priority: Medium    Asthma 12/05/2022     Priority: Medium    Hypertriglyceridemia 11/05/2022     Priority: Medium    Hashimoto's thyroiditis 07/14/2022     Priority: Medium    Insomnia 07/14/2022     Priority: Medium    Chronic fatigue syndrome 04/25/2022     Priority: Medium     Enid Barr      Cobalamin deficiency 02/18/2021     Priority: Medium      Cobalamin deficiency; [id number]: Deficiency of other specified B group vitaminsDiagnosis: SNOMED-CT: 937962712, E53.8      Vitamin D deficiency 2021     Priority: Medium     Vitamin D deficiency; MkjkmuBMK56Okykowovimt: Vitamin D deficiency, unspecifiedDiagnosis: SNOMED-CT: 36858566, E55.9      Iron deficiency anemia 2020     Priority: Medium     Iron deficiency anemia; [id number]: Iron deficiency anemia, unspecifiedDiagnosis: SNOMED-CT: 62715327, D50.9      Hemorrhagic cystitis 2020     Priority: Medium     Hemorrhagic cystitis; VkyvakRNO06Yvjqxxvjzsj: Cystitis, unspecified with hematuriaDiagnosis: SNOMED-CT: 94964338, N30.91      History of renal calculi 2019     Priority: Medium     History of calculus of kidney; YybkjnEKW98Nlavsgleclq: Personal history of urinary calculiDiagnosis: SNOMED-CT: 811855479, Z87.442      Autoantibody titer positive 10/19/2018     Priority: Medium     Autoantibody titer positive; UfvvxbKYR82Ewarvbchmdn: Raised antibody titerDiagnosis: SNOMED-CT: 644962881, R76.0      Abdominal bloating 2018     Priority: Medium     Constipation; QmmfpxHHZ59Crwdsaljdmn: Constipation, unspecifiedDiagnosis: SNOMED-CT: 43875093, K59.00  Abdominal bloating; ThgtbpPBI96Yuioawbosyj: Abdominal distension (gaseous)Diagnosis: SNOMED-CT: 309489477, R14.0      Kidney stone 2017     Priority: Medium     Kidney stone; PuzmemPFQ27Wjiniagnkjd: Calculus of kidneyDiagnosis: SNOMED-CT: 13270464, N20.0      Diverticular disease 2017     Priority: Medium     Diverticular disease; AvbqtiWMP44Jzkcfyklzju: Personal history of other diseases of the digestive systemDiagnosis: SNOMED-CT: 666399664, Z87.19      Adhesion of omentum 2011     Priority: Medium      History reviewed. No pertinent past medical history.  Past Surgical History:   Procedure Laterality Date     SECTION      x4    CYSTOSCOPY      LITHOTRIPSY        Current Outpatient Medications   Medication Sig Dispense Refill    albuterol (PROAIR HFA/PROVENTIL HFA/VENTOLIN HFA) 108 (90 Base) MCG/ACT inhaler Inhale 1-2 puffs into the lungs every 4 hours as needed      budesonide-formoterol (BREYNA) 160-4.5 MCG/ACT Inhaler Inhale 2 puffs into the lungs two times daily      cetirizine (ZYRTEC) 10 MG tablet Take 10 mg by mouth daily      cholecalciferol 125 MCG (5000 UT) CAPS       Cobalamin Combinations (B12 FOLATE) 800-800 MCG CAPS       famotidine (PEPCID) 20 MG tablet Take 1 tablet (20 mg) by mouth 2 times daily      fexofenadine (ALLEGRA ALLERGY) 180 MG tablet Take 1 tablet by mouth daily      levothyroxine (SYNTHROID/LEVOTHROID) 50 MCG tablet Take 1 tablet daily, skip Sundays 90 tablet 3    LIOTHYRONINE SODIUM PO Take 7.5 mcg by mouth daily Skip Sundays--gets from compounding pharmacy      [facility] Natural Products (ATRANTIL) CAPS       Multiple Vitamin (MULTIVITAMIN ADULT PO) Take 1 tablet by mouth daily      naltrexone 1.5 MG capsule Take 9 mg by mouth at bedtime      potassium citrate (UROCIT-K) 10 MEQ (1080 MG) CR tablet Take 2 tablets (20 mEq) by mouth 2 times daily. 360 tablet 1    Quercetin 500 MG CAPS 800 mg by oral route.      tamsulosin (FLOMAX) 0.4 MG capsule Take 1 capsule (0.4 mg) by mouth daily 30 capsule 0    traZODone (DESYREL) 150 MG tablet Take 1 tablet (150 mg) by mouth at bedtime 90 tablet 1    WELCHOL 625 MG tablet Take 625 mg by mouth every 24 hours      zoledronic acid (RECLAST) 5 MG/100ML SOLN infusion Inject 100 mLs (5 mg) into the vein         No Known Allergies     Social History     Tobacco Use    Smoking status: Never    Smokeless tobacco: Never   Substance Use Topics    Alcohol use: Not Currently     Family History   Problem Relation Age of Onset    Osteoporosis Mother     Hypertension Mother     Hypertension Father     Hyperlipidemia Father     Dementia Father     Hypertension Sister     Osteoporosis Sister     Arrhythmia Brother          "allergy related    Anxiety Disorder Son     Anxiety Disorder Son      History   Drug Use Unknown             Review of Systems  CONSTITUTIONAL: NEGATIVE for fever, chills, change in weight  INTEGUMENTARY/SKIN: NEGATIVE for worrisome rashes, moles or lesions  EYES: NEGATIVE for vision changes or irritation  ENT/MOUTH: NEGATIVE for ear, mouth and throat problems  RESP: NEGATIVE for significant cough or SOB  CV: NEGATIVE for chest pain, palpitations or peripheral edema  GI: As per HPI (occasional nausea with kidney stones)   female: As per HPI  MUSCULOSKELETAL: NEGATIVE for significant arthralgias or myalgia  NEURO: NEGATIVE for weakness, dizziness or paresthesias  ENDOCRINE: NEGATIVE for temperature intolerance, skin/hair changes  HEME: NEGATIVE for bleeding problems  PSYCHIATRIC: NEGATIVE for changes in mood or affect    Objective    /73 (BP Location: Left arm, Patient Position: Sitting, Cuff Size: Adult Small)   Pulse 77   Temp 98  F (36.7  C) (Oral)   Resp 16   Ht 1.499 m (4' 11\")   Wt 47.6 kg (105 lb)   LMP  (LMP Unknown)   SpO2 98%   BMI 21.21 kg/m     Estimated body mass index is 21.21 kg/m  as calculated from the following:    Height as of this encounter: 1.499 m (4' 11\").    Weight as of this encounter: 47.6 kg (105 lb).  Physical Exam  GENERAL: alert and no distress  EYES: Eyes grossly normal to inspection, PERRL and conjunctivae and sclerae normal  HENT: ear canals and TM's normal, nose and mouth without ulcers or lesions  NECK: no adenopathy, no asymmetry, masses, or scars  RESP: lungs clear to auscultation - no rales, rhonchi or wheezes  CV: regular rate and rhythm, normal S1 S2, no S3 or S4, no murmur, click or rub, no peripheral edema  ABDOMEN: soft, nontender, no hepatosplenomegaly, no masses and bowel sounds normal  MS: no gross musculoskeletal defects noted, no edema  SKIN: no suspicious lesions or rashes  NEURO: Normal strength and tone, mentation intact and speech normal  PSYCH: " "mentation appears normal, affect normal/bright    No results for input(s): \"HGB\", \"PLT\", \"INR\", \"NA\", \"POTASSIUM\", \"CR\", \"A1C\" in the last 8760 hours.     Diagnostics  Recent Results (from the past 24 hour(s))   CBC with platelets    Collection Time: 09/10/24  2:10 PM   Result Value Ref Range    WBC Count 5.9 4.0 - 11.0 10e3/uL    RBC Count 4.75 3.80 - 5.20 10e6/uL    Hemoglobin 13.3 11.7 - 15.7 g/dL    Hematocrit 40.7 35.0 - 47.0 %    MCV 86 78 - 100 fL    MCH 28.0 26.5 - 33.0 pg    MCHC 32.7 31.5 - 36.5 g/dL    RDW 14.3 10.0 - 15.0 %    Platelet Count 237 150 - 450 10e3/uL   Basic metabolic panel  (Ca, Cl, CO2, Creat, Gluc, K, Na, BUN)    Collection Time: 09/10/24  2:10 PM   Result Value Ref Range    Sodium 139 135 - 145 mmol/L    Potassium 4.2 3.4 - 5.3 mmol/L    Chloride 104 98 - 107 mmol/L    Carbon Dioxide (CO2) 23 22 - 29 mmol/L    Anion Gap 12 7 - 15 mmol/L    Urea Nitrogen 13.0 6.0 - 20.0 mg/dL    Creatinine 0.67 0.51 - 0.95 mg/dL    GFR Estimate >90 >60 mL/min/1.73m2    Calcium 9.2 8.8 - 10.4 mg/dL    Glucose 76 70 - 99 mg/dL   Ferritin    Collection Time: 09/10/24  2:10 PM   Result Value Ref Range    Ferritin 58 11 - 328 ng/mL   Iron and iron binding capacity    Collection Time: 09/10/24  2:10 PM   Result Value Ref Range    Iron 41 37 - 145 ug/dL    Iron Binding Capacity 295 240 - 430 ug/dL    Iron Sat Index 14 (L) 15 - 46 %      No EKG required, no history of coronary heart disease, significant arrhythmia, peripheral arterial disease or other structural heart disease.    Revised Cardiac Risk Index (RCRI)  The patient has the following serious cardiovascular risks for perioperative complications:   - No serious cardiac risks = 0 points     RCRI Interpretation: 0 points: Class I (very low risk - 0.4% complication rate)         Signed Electronically by: Amparo Aranda PA-C  A copy of this evaluation report is provided to the requesting physician.         "

## 2024-09-10 NOTE — PROGRESS NOTES
During patient draw, they inquire if a iron lab could be done. Informed patient a message would be sent to the provider to determine. Please review.    Thank You,    Nataliia CARREON [facility]  P: [phone]

## 2024-09-10 NOTE — PATIENT INSTRUCTIONS
How to Take Your Medication Before Surgery  Preoperative Medication Instructions   Antiplatelet or Anticoagulation Medication Instructions   - Patient is on no antiplatelet or anticoagulation medications.    Additional Medication Instructions  Take Welchol, naltrexone, Allegra and trazodone as prescribed night before   - Herbal medications and vitamins: DO NOT TAKE 14 days prior to surgery.   - LABA, inhaled corticosteroid, long-acting anticholinergics: Continue without modification.   - rescue Inhaler: Continue PRN. Bring to hospital on the day of surgery.   - postassium: DO NOT TAKE day of surgery.   Hold morning dose of cetrizine  Take levothyroxine/liothyronine morning of   Tamsulosin hold morning of (unless urology tells you otherwise)    Patient Education   Preparing for Your Surgery  Getting started  A nurse will call you to review your health history and instructions. They will give you an arrival time based on your scheduled surgery time. Please be ready to share:  Your doctor's clinic name and phone number  Your medical, surgical, and anesthesia history  A list of allergies and sensitivities  A list of medicines, including herbal treatments and over-the-counter drugs  Whether the patient has a legal guardian (ask how to send us the papers in advance)  Please tell us if you're pregnant--or if there's any chance you might be pregnant. Some surgeries may injure a fetus (unborn baby), so they require a pregnancy test. Surgeries that are safe for a fetus don't always need a test, and you can choose whether to have one.   If you have a child who's having surgery, please ask for a copy of Preparing for Your Child's Surgery.    Preparing for surgery  Within 10 to 30 days of surgery: Have a pre-op exam (sometimes called an H&P, or History and Physical). This can be done at a clinic or pre-operative center.  If you're having a , you may not need this exam. Talk to your care team.  At your pre-op exam, talk  to your care team about all medicines you take. If you need to stop any medicines before surgery, ask when to start taking them again.  We do this for your safety. Many medicines can make you bleed too much during surgery. Some change how well surgery (anesthesia) drugs work.  Call your insurance company to let them know you're having surgery. (If you don't have insurance, call [phone].)  Call your clinic if there's any change in your health. This includes signs of a cold or flu (sore throat, runny nose, cough, rash, fever). It also includes a scrape or scratch near the surgery site.  If you have questions on the day of surgery, call your hospital or surgery center.  Eating and drinking guidelines  For your safety: Unless your surgeon tells you otherwise, follow the guidelines below.  Eat and drink as usual until 8 hours before you arrive for surgery. After that, no food or milk.  Drink clear liquids until 2 hours before you arrive. These are liquids you can see through, like water, Gatorade, and Propel Water. They also include plain black coffee and tea (no cream or milk), candy, and breath mints. You can spit out gum when you arrive.  If you drink alcohol: Stop drinking it the night before surgery.  If your care team tells you to take medicine on the morning of surgery, it's okay to take it with a sip of water.  Preventing infection  Shower or bathe the night before and morning of your surgery. Follow the instructions your clinic gave you. (If no instructions, use regular soap.)  Don't shave or clip hair near your surgery site. We'll remove the hair if needed.  Don't smoke or vape the morning of surgery. You may chew nicotine gum up to 2 hours before surgery. A nicotine patch is okay.  Note: Some surgeries require you to completely quit smoking and nicotine. Check with your surgeon.  Your care team will make every effort to keep you safe from infection. We will:  Clean our hands often with soap and water (or  an alcohol-based hand rub).  Clean the skin at your surgery site with a special soap that kills germs.  Give you a special gown to keep you warm. (Cold raises the risk of infection.)  Wear special hair covers, masks, gowns and gloves during surgery.  Give antibiotic medicine, if prescribed. Not all surgeries need antibiotics.  What to bring on the day of surgery  Photo ID and insurance card  Copy of your health care directive, if you have one  Glasses and hearing aids (bring cases)  You can't wear contacts during surgery  Inhaler and eye drops, if you use them (tell us about these when you arrive)  CPAP machine or breathing device, if you use them  A few personal items, if spending the night  If you have . . .  A pacemaker, ICD (cardiac defibrillator) or other implant: Bring the ID card.  An implanted stimulator: Bring the remote control.  A legal guardian: Bring a copy of the certified (court-stamped) guardianship papers.  Please remove any jewelry, including body piercings. Leave jewelry and other valuables at home.  If you're going home the day of surgery  You must have a responsible adult drive you home. They should stay with you overnight as well.  If you don't have someone to stay with you, and you aren't safe to go home alone, we may keep you overnight. Insurance often won't pay for this.  After surgery  If it's hard to control your pain or you need more pain medicine, please call your surgeon's office.  Questions?   If you have any questions for your care team, list them here: _________________________________________________________________________________________________________________________________________________________________________ ____________________________________ ____________________________________ ____________________________________  For informational purposes only. Not to replace the advice of your health care provider. Copyright   2003, 2019 [facility]. All rights  reserved. Clinically reviewed by Komal Virk MD. [employer] [id number] - REV 12/22.

## 2024-09-16 ENCOUNTER — TELEPHONE (OUTPATIENT)
Dept: FAMILY MEDICINE | Facility: CLINIC | Age: 59
End: 2024-09-16
Payer: COMMERCIAL

## 2024-09-16 NOTE — TELEPHONE ENCOUNTER
RN spoke to patient     Reviewed results note below from Amparo Aranda, PAC      Patient has no questions at this time     Isabelle Melo, Registered Nurse  [facility]

## 2024-09-16 NOTE — TELEPHONE ENCOUNTER
Amparo Aranda PA-C  [facility]  Please call patient with results. She has not seen them on MyChart    Sabino Melendez,     Labs are looking good.  Iron levels look generally good other than the iron saturation index is just slightly low. Ferritin (iron stores) are normal.  Complete blood count is normal.  Electrolytes and kidney function are normal.  Good luck with your procedure!     Thank you,  Amparo Melo, Registered Nurse  [facility]

## 2024-09-18 ENCOUNTER — HOSPITAL ENCOUNTER (OUTPATIENT)
Facility: CLINIC | Age: 59
Discharge: HOME | End: 2024-09-18
Attending: UROLOGY | Admitting: UROLOGY
Payer: COMMERCIAL

## 2024-09-18 ENCOUNTER — APPOINTMENT (OUTPATIENT)
Dept: GENERAL RADIOLOGY | Facility: CLINIC | Age: 59
End: 2024-09-18
Attending: UROLOGY
Payer: COMMERCIAL

## 2024-09-18 ENCOUNTER — ANESTHESIA (OUTPATIENT)
Dept: SURGERY | Facility: CLINIC | Age: 59
End: 2024-09-18
Payer: COMMERCIAL

## 2024-09-18 ENCOUNTER — ANESTHESIA EVENT (OUTPATIENT)
Dept: SURGERY | Facility: CLINIC | Age: 59
End: 2024-09-18
Payer: COMMERCIAL

## 2024-09-18 VITALS
TEMPERATURE: 97.5 F | HEIGHT: 59 IN | SYSTOLIC BLOOD PRESSURE: 113 MMHG | DIASTOLIC BLOOD PRESSURE: 77 MMHG | HEART RATE: 70 BPM | BODY MASS INDEX: 21.79 KG/M2 | RESPIRATION RATE: 15 BRPM | WEIGHT: 108.1 LBS | OXYGEN SATURATION: 97 %

## 2024-09-18 DIAGNOSIS — N20.0 KIDNEY STONE: ICD-10-CM

## 2024-09-18 DIAGNOSIS — N20.0 KIDNEY STONE ON LEFT SIDE: ICD-10-CM

## 2024-09-18 DIAGNOSIS — N20.0 RIGHT KIDNEY STONE: Primary | ICD-10-CM

## 2024-09-18 PROCEDURE — 370N000017 HC ANESTHESIA TECHNICAL FEE, PER MIN: Performed by: UROLOGY

## 2024-09-18 PROCEDURE — 52356 CYSTO/URETERO W/LITHOTRIPSY: CPT | Performed by: ANESTHESIOLOGY

## 2024-09-18 PROCEDURE — 250N000011 HC RX IP 250 OP 636: Performed by: NURSE ANESTHETIST, CERTIFIED REGISTERED

## 2024-09-18 PROCEDURE — 272N000002 HC OR SUPPLY OTHER OPNP: Performed by: UROLOGY

## 2024-09-18 PROCEDURE — 74420 UROGRAPHY RTRGR +-KUB: CPT | Mod: 26 | Performed by: UROLOGY

## 2024-09-18 PROCEDURE — 258N000003 HC RX IP 258 OP 636: Performed by: NURSE ANESTHETIST, CERTIFIED REGISTERED

## 2024-09-18 PROCEDURE — 272N000001 HC OR GENERAL SUPPLY STERILE: Performed by: UROLOGY

## 2024-09-18 PROCEDURE — 258N000003 HC RX IP 258 OP 636: Performed by: ANESTHESIOLOGY

## 2024-09-18 PROCEDURE — C2617 STENT, NON-COR, TEM W/O DEL: HCPCS | Performed by: UROLOGY

## 2024-09-18 PROCEDURE — 999N000179 XR SURGERY CARM FLUORO LESS THAN 5 MIN W STILLS: Mod: TC

## 2024-09-18 PROCEDURE — 250N000025 HC SEVOFLURANE, PER MIN: Performed by: UROLOGY

## 2024-09-18 PROCEDURE — 250N000009 HC RX 250: Performed by: UROLOGY

## 2024-09-18 PROCEDURE — C1758 CATHETER, URETERAL: HCPCS | Performed by: UROLOGY

## 2024-09-18 PROCEDURE — 52332 CYSTOSCOPY AND TREATMENT: CPT | Mod: 59 | Performed by: UROLOGY

## 2024-09-18 PROCEDURE — 250N000011 HC RX IP 250 OP 636: Performed by: ANESTHESIOLOGY

## 2024-09-18 PROCEDURE — 999N000141 HC STATISTIC PRE-PROCEDURE NURSING ASSESSMENT: Performed by: UROLOGY

## 2024-09-18 PROCEDURE — 250N000011 HC RX IP 250 OP 636: Performed by: UROLOGY

## 2024-09-18 PROCEDURE — C1894 INTRO/SHEATH, NON-LASER: HCPCS | Performed by: UROLOGY

## 2024-09-18 PROCEDURE — 360N000077 HC SURGERY LEVEL 4, PER MIN: Performed by: UROLOGY

## 2024-09-18 PROCEDURE — 710N000012 HC RECOVERY PHASE 2, PER MINUTE: Performed by: UROLOGY

## 2024-09-18 PROCEDURE — 250N000009 HC RX 250: Performed by: NURSE ANESTHETIST, CERTIFIED REGISTERED

## 2024-09-18 PROCEDURE — 82365 CALCULUS SPECTROSCOPY: CPT | Performed by: UROLOGY

## 2024-09-18 PROCEDURE — 258N000001 HC RX 258: Performed by: UROLOGY

## 2024-09-18 PROCEDURE — 250N000013 HC RX MED GY IP 250 OP 250 PS 637: Performed by: UROLOGY

## 2024-09-18 PROCEDURE — 52356 CYSTO/URETERO W/LITHOTRIPSY: CPT | Mod: RT | Performed by: UROLOGY

## 2024-09-18 PROCEDURE — 710N000009 HC RECOVERY PHASE 1, LEVEL 1, PER MIN: Performed by: UROLOGY

## 2024-09-18 PROCEDURE — 255N000002 HC RX 255 OP 636: Performed by: UROLOGY

## 2024-09-18 PROCEDURE — C1769 GUIDE WIRE: HCPCS | Performed by: UROLOGY

## 2024-09-18 PROCEDURE — 52356 CYSTO/URETERO W/LITHOTRIPSY: CPT

## 2024-09-18 DEVICE — STENT URETERAL POLARIS ULTRA 6FRX22CM M0061921310: Type: IMPLANTABLE DEVICE | Site: URETHRA | Status: FUNCTIONAL

## 2024-09-18 RX ORDER — PROPOFOL 10 MG/ML
INJECTION, EMULSION INTRAVENOUS PRN
Status: DISCONTINUED | OUTPATIENT
Start: 2024-09-18 | End: 2024-09-18

## 2024-09-18 RX ORDER — FENTANYL CITRATE 50 UG/ML
INJECTION, SOLUTION INTRAMUSCULAR; INTRAVENOUS PRN
Status: DISCONTINUED | OUTPATIENT
Start: 2024-09-18 | End: 2024-09-18

## 2024-09-18 RX ORDER — HYDROMORPHONE HCL IN WATER/PF 6 MG/30 ML
0.4 PATIENT CONTROLLED ANALGESIA SYRINGE INTRAVENOUS EVERY 5 MIN PRN
Status: DISCONTINUED | OUTPATIENT
Start: 2024-09-18 | End: 2024-09-18 | Stop reason: HOSPADM

## 2024-09-18 RX ORDER — DEXAMETHASONE SODIUM PHOSPHATE 4 MG/ML
4 INJECTION, SOLUTION INTRA-ARTICULAR; INTRALESIONAL; INTRAMUSCULAR; INTRAVENOUS; SOFT TISSUE
Status: DISCONTINUED | OUTPATIENT
Start: 2024-09-18 | End: 2024-09-18 | Stop reason: HOSPADM

## 2024-09-18 RX ORDER — LABETALOL HYDROCHLORIDE 5 MG/ML
10 INJECTION, SOLUTION INTRAVENOUS
Status: DISCONTINUED | OUTPATIENT
Start: 2024-09-18 | End: 2024-09-18 | Stop reason: HOSPADM

## 2024-09-18 RX ORDER — ONDANSETRON 2 MG/ML
4 INJECTION INTRAMUSCULAR; INTRAVENOUS EVERY 30 MIN PRN
Status: DISCONTINUED | OUTPATIENT
Start: 2024-09-18 | End: 2024-09-18 | Stop reason: HOSPADM

## 2024-09-18 RX ORDER — SODIUM CHLORIDE, SODIUM LACTATE, POTASSIUM CHLORIDE, CALCIUM CHLORIDE 600; 310; 30; 20 MG/100ML; MG/100ML; MG/100ML; MG/100ML
INJECTION, SOLUTION INTRAVENOUS CONTINUOUS PRN
Status: DISCONTINUED | OUTPATIENT
Start: 2024-09-18 | End: 2024-09-18

## 2024-09-18 RX ORDER — LIDOCAINE HYDROCHLORIDE 20 MG/ML
INJECTION, SOLUTION INFILTRATION; PERINEURAL PRN
Status: DISCONTINUED | OUTPATIENT
Start: 2024-09-18 | End: 2024-09-18

## 2024-09-18 RX ORDER — HYDRALAZINE HYDROCHLORIDE 20 MG/ML
2.5-5 INJECTION INTRAMUSCULAR; INTRAVENOUS EVERY 10 MIN PRN
Status: DISCONTINUED | OUTPATIENT
Start: 2024-09-18 | End: 2024-09-18 | Stop reason: HOSPADM

## 2024-09-18 RX ORDER — IOPAMIDOL 612 MG/ML
INJECTION, SOLUTION INTRAVASCULAR PRN
Status: DISCONTINUED | OUTPATIENT
Start: 2024-09-18 | End: 2024-09-18 | Stop reason: HOSPADM

## 2024-09-18 RX ORDER — DEXAMETHASONE SODIUM PHOSPHATE 4 MG/ML
INJECTION, SOLUTION INTRA-ARTICULAR; INTRALESIONAL; INTRAMUSCULAR; INTRAVENOUS; SOFT TISSUE PRN
Status: DISCONTINUED | OUTPATIENT
Start: 2024-09-18 | End: 2024-09-18

## 2024-09-18 RX ORDER — ONDANSETRON 2 MG/ML
INJECTION INTRAMUSCULAR; INTRAVENOUS PRN
Status: DISCONTINUED | OUTPATIENT
Start: 2024-09-18 | End: 2024-09-18

## 2024-09-18 RX ORDER — ONDANSETRON 4 MG/1
4 TABLET, ORALLY DISINTEGRATING ORAL EVERY 30 MIN PRN
Status: DISCONTINUED | OUTPATIENT
Start: 2024-09-18 | End: 2024-09-18 | Stop reason: HOSPADM

## 2024-09-18 RX ORDER — PROPOFOL 10 MG/ML
INJECTION, EMULSION INTRAVENOUS CONTINUOUS PRN
Status: DISCONTINUED | OUTPATIENT
Start: 2024-09-18 | End: 2024-09-18

## 2024-09-18 RX ORDER — OXYCODONE HYDROCHLORIDE 5 MG/1
5 TABLET ORAL EVERY 6 HOURS PRN
Qty: 9 TABLET | Refills: 0 | Status: SHIPPED | OUTPATIENT
Start: 2024-09-18 | End: 2024-09-21

## 2024-09-18 RX ORDER — CEFAZOLIN SODIUM/WATER 2 G/20 ML
2 SYRINGE (ML) INTRAVENOUS
Status: COMPLETED | OUTPATIENT
Start: 2024-09-18 | End: 2024-09-18

## 2024-09-18 RX ORDER — OXYBUTYNIN CHLORIDE 5 MG/1
5 TABLET, EXTENDED RELEASE ORAL DAILY
Qty: 30 TABLET | Refills: 1 | Status: SHIPPED | OUTPATIENT
Start: 2024-09-18 | End: 2025-04-18

## 2024-09-18 RX ORDER — FENTANYL CITRATE 0.05 MG/ML
25 INJECTION, SOLUTION INTRAMUSCULAR; INTRAVENOUS EVERY 5 MIN PRN
Status: DISCONTINUED | OUTPATIENT
Start: 2024-09-18 | End: 2024-09-18 | Stop reason: HOSPADM

## 2024-09-18 RX ORDER — ACETAMINOPHEN 650 MG/1
650 SUPPOSITORY RECTAL ONCE
Status: COMPLETED | OUTPATIENT
Start: 2024-09-18 | End: 2024-09-18

## 2024-09-18 RX ORDER — SODIUM CHLORIDE, SODIUM LACTATE, POTASSIUM CHLORIDE, CALCIUM CHLORIDE 600; 310; 30; 20 MG/100ML; MG/100ML; MG/100ML; MG/100ML
INJECTION, SOLUTION INTRAVENOUS CONTINUOUS
Status: DISCONTINUED | OUTPATIENT
Start: 2024-09-18 | End: 2024-09-18 | Stop reason: HOSPADM

## 2024-09-18 RX ORDER — CEFAZOLIN SODIUM/WATER 2 G/20 ML
2 SYRINGE (ML) INTRAVENOUS SEE ADMIN INSTRUCTIONS
Status: DISCONTINUED | OUTPATIENT
Start: 2024-09-18 | End: 2024-09-18 | Stop reason: HOSPADM

## 2024-09-18 RX ORDER — HYDROMORPHONE HCL IN WATER/PF 6 MG/30 ML
0.2 PATIENT CONTROLLED ANALGESIA SYRINGE INTRAVENOUS EVERY 5 MIN PRN
Status: DISCONTINUED | OUTPATIENT
Start: 2024-09-18 | End: 2024-09-18 | Stop reason: HOSPADM

## 2024-09-18 RX ORDER — NALOXONE HYDROCHLORIDE 0.4 MG/ML
0.1 INJECTION, SOLUTION INTRAMUSCULAR; INTRAVENOUS; SUBCUTANEOUS
Status: DISCONTINUED | OUTPATIENT
Start: 2024-09-18 | End: 2024-09-18 | Stop reason: HOSPADM

## 2024-09-18 RX ORDER — ACETAMINOPHEN 325 MG/1
975 TABLET ORAL ONCE
Status: DISCONTINUED | OUTPATIENT
Start: 2024-09-18 | End: 2024-09-18 | Stop reason: HOSPADM

## 2024-09-18 RX ORDER — FENTANYL CITRATE 0.05 MG/ML
50 INJECTION, SOLUTION INTRAMUSCULAR; INTRAVENOUS EVERY 5 MIN PRN
Status: DISCONTINUED | OUTPATIENT
Start: 2024-09-18 | End: 2024-09-18 | Stop reason: HOSPADM

## 2024-09-18 RX ORDER — MAGNESIUM HYDROXIDE 1200 MG/15ML
LIQUID ORAL PRN
Status: DISCONTINUED | OUTPATIENT
Start: 2024-09-18 | End: 2024-09-18 | Stop reason: HOSPADM

## 2024-09-18 RX ORDER — ACETAMINOPHEN 325 MG/1
975 TABLET ORAL ONCE
Status: COMPLETED | OUTPATIENT
Start: 2024-09-18 | End: 2024-09-18

## 2024-09-18 RX ORDER — HYDROXYZINE HYDROCHLORIDE 25 MG/1
25 TABLET, FILM COATED ORAL EVERY 6 HOURS PRN
Status: DISCONTINUED | OUTPATIENT
Start: 2024-09-18 | End: 2024-09-18 | Stop reason: HOSPADM

## 2024-09-18 RX ORDER — ASPIRIN 81 MG
100 TABLET, DELAYED RELEASE (ENTERIC COATED) ORAL DAILY
Qty: 60 TABLET | Refills: 1 | Status: SHIPPED | OUTPATIENT
Start: 2024-09-18 | End: 2025-04-18

## 2024-09-18 RX ORDER — TAMSULOSIN HYDROCHLORIDE 0.4 MG/1
0.4 CAPSULE ORAL DAILY
Qty: 30 CAPSULE | Refills: 0 | Status: SHIPPED | OUTPATIENT
Start: 2024-09-18 | End: 2025-04-18

## 2024-09-18 RX ORDER — KETOROLAC TROMETHAMINE 30 MG/ML
INJECTION, SOLUTION INTRAMUSCULAR; INTRAVENOUS PRN
Status: DISCONTINUED | OUTPATIENT
Start: 2024-09-18 | End: 2024-09-18

## 2024-09-18 RX ADMIN — MIDAZOLAM 2 MG: 1 INJECTION INTRAMUSCULAR; INTRAVENOUS at 12:06

## 2024-09-18 RX ADMIN — FENTANYL CITRATE 50 MCG: 50 INJECTION INTRAMUSCULAR; INTRAVENOUS at 12:18

## 2024-09-18 RX ADMIN — ACETAMINOPHEN 975 MG: 325 TABLET ORAL at 09:30

## 2024-09-18 RX ADMIN — Medication 2 G: at 12:05

## 2024-09-18 RX ADMIN — PROPOFOL 30 MG: 10 INJECTION, EMULSION INTRAVENOUS at 12:30

## 2024-09-18 RX ADMIN — ONDANSETRON 4 MG: 2 INJECTION INTRAMUSCULAR; INTRAVENOUS at 12:14

## 2024-09-18 RX ADMIN — KETOROLAC TROMETHAMINE 15 MG: 30 INJECTION, SOLUTION INTRAMUSCULAR at 12:58

## 2024-09-18 RX ADMIN — DEXAMETHASONE SODIUM PHOSPHATE 4 MG: 4 INJECTION, SOLUTION INTRA-ARTICULAR; INTRALESIONAL; INTRAMUSCULAR; INTRAVENOUS; SOFT TISSUE at 12:14

## 2024-09-18 RX ADMIN — LIDOCAINE HYDROCHLORIDE 60 MG: 20 INJECTION, SOLUTION INFILTRATION; PERINEURAL at 12:10

## 2024-09-18 RX ADMIN — SODIUM CHLORIDE, POTASSIUM CHLORIDE, SODIUM LACTATE AND CALCIUM CHLORIDE: 600; 310; 30; 20 INJECTION, SOLUTION INTRAVENOUS at 12:05

## 2024-09-18 RX ADMIN — FENTANYL CITRATE 50 MCG: 50 INJECTION INTRAMUSCULAR; INTRAVENOUS at 12:10

## 2024-09-18 RX ADMIN — PHENYLEPHRINE HYDROCHLORIDE 50 MCG: 10 INJECTION INTRAVENOUS at 12:29

## 2024-09-18 RX ADMIN — PROPOFOL 50 MCG/KG/MIN: 10 INJECTION, EMULSION INTRAVENOUS at 12:12

## 2024-09-18 RX ADMIN — PROPOFOL 120 MG: 10 INJECTION, EMULSION INTRAVENOUS at 12:10

## 2024-09-18 ASSESSMENT — ACTIVITIES OF DAILY LIVING (ADL)
ADLS_ACUITY_SCORE: 35

## 2024-09-18 ASSESSMENT — LIFESTYLE VARIABLES: TOBACCO_USE: 0

## 2024-09-18 NOTE — DISCHARGE INSTRUCTIONS
** Today you were given 975 mg of Tylenol at 9:30am. The recommended daily maximum dose is 4000 mg. **    ** Today you received Toradol, an antiinflammatory medication similar to Ibuprofen.  You should not take other antiinflammatory medication, such as Ibuprofen, Motrin, Advil, Aleve, Naprosyn, etc until 7pm. **           Same Day Surgery Discharge Instructions for  Sedation and General Anesthesia     It's not unusual to feel dizzy, light-headed or faint for up to 24 hours after surgery or while taking pain medication.  If you have these symptoms: sit for a few minutes before standing and have someone assist you when you get up to walk or use the bathroom.    You should rest and relax for the next 24 hours. We recommend you make arrangements to have an adult stay with you for at least 24 hours after your discharge.  Avoid hazardous and strenuous activity.    DO NOT DRIVE any vehicle or operate mechanical equipment for 24 hours following the end of your surgery.  Even though you may feel normal, your reactions may be affected by the medication you have received.    Do not drink alcoholic beverages for 24 hours following surgery.     Slowly progress to your regular diet as you feel able. It's not unusual to feel nauseated and/or vomit after receiving anesthesia.  If you develop these symptoms, drink clear liquids (apple juice, ginger ale, broth, 7-up, etc. ) until you feel better.  If your nausea and vomiting persists for 24 hours, please notify your surgeon.      All narcotic pain medications, along with inactivity and anesthesia, can cause constipation. Drinking plenty of liquids and increasing fiber intake will help.    For any questions of a medical nature, call your surgeon.    Do not make important decisions for 24 hours.    If you had general anesthesia, you may have a sore throat for a couple of days related to the breathing tube used during surgery.  You may use Cepacol lozenges to help with this  discomfort.  If it worsens or if you develop a fever, contact your surgeon.     If you feel your pain is not well managed with the pain medications prescribed by your surgeon, please contact your surgeon's office to let them know so they can address your concerns.             POSTOPERATIVE INSTRUCTIONS    Diagnosis-------------------------------   Bilateral kidney stones    Procedure-------------------------------  Procedure(s) (LRB):  CYSTOSCOPY WITH RIGHT RETROGRADE PYELOGRAM, RIGHT URETEROSCOPY WITH LASER LITHOTRIPSY AND BASKET REMOVAL OF STONE, RIGHT URETERAL STENT PLACEMENT, LEFT RETROGRADE PYELOGRAM AND URETERAL STENT PLACEMENT (Bilateral)      Findings--------------------------------  RIGHT kidney stones removed  Bilateral ureteral stents placed    Home-going instructions-----------------         Activity Limitation:     - No driving or operating heavy machinery while on narcotic pain medication.     FOLLOW THESE INSTRUCTIONS AS INDICATED BELOW:  - Observe operative area for signs of excessive bleeding.  - You may shower.  - Increase fluid intake to promote clear urine.  - Resume usual diet as tolerated    What to expect while recovering-----------  - You may experience some intermittent bleeding that makes your urine pink or cherry colored. This is normal.  - However, if you are unable to urinate, passing large amount of clots, have desi blood in your urine, or have a temperature >101 degrees, call the urology nurse on call, or present to your nearest emergency department.  - You are encouraged to walk daily, and have no activity restrictions.   - A URETERAL STENT has been placed that allows urine to flow unobstructed from your kidney into your bladder.  The stent has a curl in the kidney and a curl in the bladder.  The curl in the bladder can cause some urgency and frequency of urination as well as some mild blood in the urine.  The curl in the kidney can cause some mild flank discomfort.  This may be more  noticeable when you urinate.  A URETERAL STENT is meant to be left in temporarily.  It must be removed or changed no later than 3 months after it's insertion.  If it's not removed it can result in stone overgrowth on the stent that can cause pain, infection, and can be very difficult to remove.      Discharge Medications/instructions:   - Flomax (tamsulosin) to be taken daily until stent is removed  - oxybutynin 5mg XL (Ditropan XL) to be taken daily until ureteral stent is removed  - Take Tylenol 1000mg every 6 hours for pain  - Take Ibuprofen 600mg every 6 hours as needed for additional pain control  - Take Oxycodone 5mg every 4-6 hours only for break through pain  - Take Colace while taking Oxycodone to prevent constipation      Questions/concerns------------------------  [facility]: [phone]    Future appointments  Follow up for Ureteroscopy on 10/4/2024  Michael Erazo MD       ** If you have questions or concerns about your procedure,   call Dr. Erazo at [phone] **

## 2024-09-18 NOTE — ANESTHESIA POSTPROCEDURE EVALUATION
Patient: Nora Vargas    Procedure: Procedure(s):  CYSTOSCOPY WITH RIGHT RETROGRADE PYELOGRAM, RIGHT URETEROSCOPY WITH LASER LITHOTRIPSY AND BASKET REMOVAL OF STONE, RIGHT URETERAL STENT PLACEMENT, LEFT RETROGRADE PYELOGRAM AND URETERAL STENT PLACEMENT       Anesthesia Type:  General    Note:     Postop Pain Control: Uneventful            Sign Out: Well controlled pain   PONV: No   Neuro/Psych: Uneventful            Sign Out: Acceptable/Baseline neuro status   Airway/Respiratory: Uneventful            Sign Out: Acceptable/Baseline resp. status   CV/Hemodynamics: Uneventful            Sign Out: Acceptable CV status   Other NRE: NONE   DID A NON-ROUTINE EVENT OCCUR?            Last vitals:  Vitals Value Taken Time   /65 09/18/24 1430   Temp 36.4  C (97.6  F) 09/18/24 1400   Pulse 69 09/18/24 1438   Resp 9 09/18/24 1438   SpO2 95 % 09/18/24 1437   Vitals shown include unfiled device data.    Electronically Signed By: Doni Talbot MD  September 18, 2024  3:46 PM

## 2024-09-18 NOTE — ANESTHESIA PROCEDURE NOTES
Airway       Patient location: [facility] - Operating Room or Procedural Area.  Staff -        Anesthesiologist:  Doni Talbot MD       CRNA: Isabelle Brandon APRN CRNA       Performed By: CRNA  Consent for Airway        Urgency: elective  Indications and Patient Condition       Indications for airway management: austen-procedural       Induction type:intravenous       Mask difficulty assessment: 0 - not attempted    Final Airway Details       Final airway type: supraglottic airway    Supraglottic Airway Details        Type: LMA       Brand: I-Gel       LMA size: 4    Post intubation assessment        Placement verified by: capnometry, equal breath sounds and chest rise        Number of attempts at approach: 1       Number of other approaches attempted: 0       Ease of procedure: easy       Dentition: Intact and Unchanged

## 2024-09-18 NOTE — ANESTHESIA PREPROCEDURE EVALUATION
Anesthesia Pre-Procedure Evaluation    Patient: Nora Vargas   MRN: [id number] : 1965        Procedure : Procedure(s):  CYSTOSCOPY WITH RIGHT RETROGRADE PYELOGRAM, RIGHT URETEROSCOPY WITH LASER LITHOTRIPSY AND BASKET REMOVAL OF STONE, RIGHT URETERAL STENT PLACEMENT, LEFT RETROGRADE PYELOGRAM AND URETERAL STENT PLACEMENT          No past medical history on file.   Past Surgical History:   Procedure Laterality Date     SECTION      x4    CYSTOSCOPY      LITHOTRIPSY        No Known Allergies   Social History     Tobacco Use    Smoking status: Never    Smokeless tobacco: Never   Substance Use Topics    Alcohol use: Not Currently      Wt Readings from Last 1 Encounters:   09/10/24 47.6 kg (105 lb)        Anesthesia Evaluation   Pt has had prior anesthetic. Type: General.    No history of anesthetic complications       ROS/MED HX  ENT/Pulmonary:     (+)                      asthma               (-) tobacco use   Neurologic:       Cardiovascular: Comment: Mast cell activation syndrome      METS/Exercise Tolerance:     Hematologic:     (+)      anemia,          Musculoskeletal:       GI/Hepatic:       Renal/Genitourinary:     (+)       Nephrolithiasis ,       Endo:     (+)          thyroid problem, hypothyroidism,           Psychiatric/Substance Use: Comment: Chronic fatigue syndrome      Infectious Disease:       Malignancy:       Other:      (+)  , ,, other significant disability Wheelchair bound         Physical Exam    Airway        Mallampati: II   TM distance: > 3 FB   Neck ROM: full   Mouth opening: > 3 cm    Respiratory Devices and Support         Dental           Cardiovascular          Rhythm and rate: regular and normal     Pulmonary           breath sounds clear to auscultation           OUTSIDE LABS:  CBC:   Lab Results   Component Value Date    WBC 5.9 09/10/2024    HGB 13.3 09/10/2024    HCT 40.7 09/10/2024     09/10/2024     BMP:   Lab Results   Component Value Date      "09/10/2024    POTASSIUM 4.2 09/10/2024    CHLORIDE 104 09/10/2024    CO2 23 09/10/2024    BUN 13.0 09/10/2024    CR 0.67 09/10/2024    GLC 76 09/10/2024     COAGS: No results found for: \"PTT\", \"INR\", \"FIBR\"  POC: No results found for: \"BGM\", \"HCG\", \"HCGS\"  HEPATIC: No results found for: \"ALBUMIN\", \"PROTTOTAL\", \"ALT\", \"AST\", \"GGT\", \"ALKPHOS\", \"BILITOTAL\", \"BILIDIRECT\", \"BROCK\"  OTHER:   Lab Results   Component Value Date    LESTER 9.2 09/10/2024    TSH 3.38 07/24/2024    T4 1.52 07/24/2024       Anesthesia Plan    ASA Status:  3    NPO Status:  NPO Appropriate    Anesthesia Type: General.     - Airway: LMA   Induction: Intravenous.   Maintenance: Balanced.        Consents    Anesthesia Plan(s) and associated risks, benefits, and realistic alternatives discussed. Questions answered and patient/representative(s) expressed understanding.     - Discussed:     - Discussed with:  Patient            Postoperative Care    Pain management: IV analgesics, Oral pain medications, Multi-modal analgesia.   PONV prophylaxis: Ondansetron (or other 5HT-3), Dexamethasone or Solumedrol     Comments:               Doni Talbot MD    I have reviewed the pertinent notes and labs in the chart from the past 30 days and (re)examined the patient.  Any updates or changes from those notes are reflected in this note.                  "

## 2024-09-18 NOTE — ANESTHESIA CARE TRANSFER NOTE
Patient: Nora Vargas    Procedure: Procedure(s):  CYSTOSCOPY WITH RIGHT RETROGRADE PYELOGRAM, RIGHT URETEROSCOPY WITH LASER LITHOTRIPSY AND BASKET REMOVAL OF STONE, RIGHT URETERAL STENT PLACEMENT, LEFT RETROGRADE PYELOGRAM AND URETERAL STENT PLACEMENT       Diagnosis: Right kidney stone [N20.0]  Calculus of left kidney [N20.0]  Diagnosis Additional Information: No value filed.    Anesthesia Type:   General     Note:    Oropharynx: oropharynx clear of all foreign objects and spontaneously breathing  Level of Consciousness: drowsy  Oxygen Supplementation: face mask  Level of Supplemental Oxygen (L/min / FiO2): 6  Independent Airway: airway patency satisfactory and stable  Dentition: dentition unchanged  Vital Signs Stable: post-procedure vital signs reviewed and stable  Report to RN Given: handoff report given  Patient transferred to: Phase II    Handoff Report: Identifed the Patient, Identified the Reponsible Provider, Reviewed the pertinent medical history, Discussed the surgical course, Reviewed Intra-OP anesthesia mangement and issues during anesthesia, Set expectations for post-procedure period and Allowed opportunity for questions and acknowledgement of understanding      Vitals:  Vitals Value Taken Time   /80 09/18/24 1315   Temp     Pulse 56 09/18/24 1317   Resp 14 09/18/24 1317   SpO2 100 % 09/18/24 1317   Vitals shown include unfiled device data.    Electronically Signed By: DAMARIS Frazier CRNA  September 18, 2024  1:18 PM

## 2024-09-18 NOTE — OP NOTE
OPERATIVE REPORT  DATE OF SURGERY: 09/18/24  LOCATION OF SURGERY: [facility]  PREOPERATIVE DIAGNOSIS:  (N20.0) Right kidney stone  (primary encounter diagnosis)  (N20.0) Kidney stone on left side  POSTOPERATIVE DIAGNOSIS: (N20.0) Right kidney stone  (primary encounter diagnosis)  (N20.0) Kidney stone on left side     START TIME: 12:25 PM  END TIME: 1:00 PM    PROCEDURE PERFORMED:   1. Cystoscopy  2. RIGHT retrograde pyelogram  3. RIGHT ureteroscopy with laser lithotripsy  4. RIGHT ureteroscopy with basketing of stones  5. RIGHT ureteral stent placement  6. LEFT Retrograde Pyelogram   7. LEFT ureteral stent placement  8. <1hr physician fluoroscopy time      STAFF SURGEON: Michael Erazo MD  ANESTHESIA: General.   ESTIMATED BLOOD LOSS: 2 mL.   DRAINS AND TUBES: Bilateral 6fr x 22cm ureteral stent, 16fr briggs catheter  COMPLICATIONS: None.   DISPOSITION: PACU.   SPECIMENS OBTAINED:   ID Type Source Tests Collected by Time Destination   A : Right Kidney Stones Calculus/Stone Kidney, Right STONE ANALYSIS Michael Erazo MD 9/18/2024  1:01 PM       SIGNIFICANT FINDINGS: Cystoscopy with no evidence of stones in the bladder.  Right retrograde pyelogram with no evidence of stones in the ureter.  Right flexible ureteroscopy with evidence of stones in the upper and lower poles which were fragmented with laser lithotripsy and all significant stone fragments removed.  Left retrograde pyelogram and stent placed.     HISTORY OF PRESENT ILLNESS: Nora Vargas is a 58 year old female with bilateral nephrolithiasis.  She was noted to have more significant stone burden on the left and was counseled that this would benefit from a staged approach.  She presents today for right ureteroscopy and bilateral stent placement with plans for left-sided ureteroscopy with steerable vacuum suction on 10/4/2024.    OPERATION PERFORMED:   Informed consent was obtained and the patient was brought to the operating room where general anesthesia was  induced. The patient was given appropriate preoperative antibiotics and positioned supine. The patient was then repositioned in dorsal lithotomy with all pressure points padded. We then performed a timeout, verifying the correct patient's site and procedure to be performed.    A 22 Kittitian cystoscope was inserted atraumatically into the bladder.  Cystoscopy was performed with no evidence of stones in the bladder.  The right ureteral orifice was identified and cannulated with a 0.035 sensor wire which was advanced up to the renal pelvis under fluoroscopic guidance and the cystoscope was removed.  A semirigid ureteroscope was assembled and inserted atraumatically into the bladder.  Using a Super Stiff wire the ureteral orifice was accessed and the ureteroscope was advanced using a railroad technique up to the level of the proximal ureter/UPJ.  A retrograde pyelogram was performed with no evidence of stones in the ureter and outlining of the collecting system.  The Super Stiff wire was advanced to the renal pelvis and the semirigid scope was removed.  An 11-13 Kittitian by 36 cm ureteral access sheath was advanced over the wire to the level of the proximal ureter/UPJ without resistance and the inner stylette and wire were removed.  Flexible ureteroscope was advanced into the renal pelvis and complete pyeloscopy was performed.  Stones were encountered in the upper and lower pole calyces.  The stone in the lower pole required laser lithotripsy and was fragmented into several pieces.  All stone fragments greater than 1 to 2 mm were basketed and removed.  The ureteroscope and access sheath were then removed en bloc with no evidence of ureteral injury.  A 6 Kittitian by 22 cm JJ ureteral stent was advanced over the wire with good curl noted in the renal pelvis fluoroscopically and bladder on direct vision.  Attention was turned to the left side the left ureteral orifice was cannulated with a 5 Kittitian open-ended catheter and a  gentle retrograde pyelogram was performed outlining the collecting system.  0.035 sensor wire was advanced up to the renal pelvis and the cystoscope and open-ended catheter were removed.  A 6 Wallisian by 22 cm JJ ureteral stent was advanced over the wire with good curl noted in the renal pelvis and in the bladder fluoroscopically.  A 16 Wallisian Paz catheter was placed with 10 cc in the balloon.  She received 10 mg IV Lasix and 15 mg IV Toradol.  She was emerged anesthesia and taken to the recovery room in stable condition.      Michael Erazo MD   Urology  [facility]  Clinic Phone [phone]

## 2024-09-20 ENCOUNTER — TELEPHONE (OUTPATIENT)
Dept: UROLOGY | Facility: CLINIC | Age: 59
End: 2024-09-20
Payer: COMMERCIAL

## 2024-09-20 NOTE — TELEPHONE ENCOUNTER
Patient calling stating she's having gross hematuria that's cherry wine in color.  Patient is 2 days post ureteroscopy with lithotripsy with bilateral ureteral stent placement.  Patient denies fever/chills or flank pain.  Discussed with patient that gross hematuria is typical with ureteral stents along with dysuria and urinary frequency/urgency.  Recommended patient to push fluids.  Went over s/s that are atypical ad when to go to the ED.  Patient states understanding.    Marie Caraballo, RN, BSN  Care Coordinator  [facility]

## 2024-09-23 LAB
APPEARANCE STONE: NORMAL
COMPN STONE: NORMAL
SPECIMEN WT: 15 MG

## 2024-09-29 ENCOUNTER — HEALTH MAINTENANCE LETTER (OUTPATIENT)
Age: 59
End: 2024-09-29

## 2024-10-03 ENCOUNTER — ANESTHESIA EVENT (OUTPATIENT)
Dept: SURGERY | Facility: CLINIC | Age: 59
End: 2024-10-03
Payer: COMMERCIAL

## 2024-10-03 ASSESSMENT — LIFESTYLE VARIABLES: TOBACCO_USE: 0

## 2024-10-03 NOTE — ANESTHESIA PREPROCEDURE EVALUATION
Anesthesia Pre-Procedure Evaluation    Patient: Nora Vargas   MRN: [id number] : 1965        Procedure : Procedure(s):  CYSTOSCOPY, LEFT URETERAL STENT REMOVAL, LEFT RETROGRADE PYELOGRAM, LEFT URETEROSCOPY WITH LASER LITHOTRIOPSY AND BASKET REMOVAL OF STONES, LEFT STEERABLE VACUUM SUCTION, LEFT URETERAL STENT PLACEMENT, RIGHT URETERAL STENT REMOVAL          Past Medical History:   Diagnosis Date    Chronic fatigue syndrome     Hashimoto's thyroiditis     Mast cell activation syndrome (H)     Osteoporosis       Past Surgical History:   Procedure Laterality Date     SECTION      x4    COMBINED CYSTOSCOPY, RETROGRADES, URETEROSCOPY, LASER HOLMIUM LITHOTRIPSY URETER(S), INSERT STENT Bilateral 2024    Procedure: CYSTOSCOPY WITH RIGHT RETROGRADE PYELOGRAM, RIGHT URETEROSCOPY WITH LASER LITHOTRIPSY AND BASKET REMOVAL OF STONE, RIGHT URETERAL STENT PLACEMENT, LEFT RETROGRADE PYELOGRAM AND URETERAL STENT PLACEMENT;  Surgeon: Michael Erazo MD;  Location: [facility]    CYSTOSCOPY      LITHOTRIPSY        No Known Allergies   Social History     Tobacco Use    Smoking status: Never    Smokeless tobacco: Never   Substance Use Topics    Alcohol use: Not Currently      Wt Readings from Last 1 Encounters:   24 49 kg (108 lb 1.6 oz)        Anesthesia Evaluation   Pt has had prior anesthetic. Type: General.    No history of anesthetic complications       ROS/MED HX  ENT/Pulmonary:     (+)     [place] risk factors,    daytime somnolence,             asthma               (-) tobacco use   Neurologic:       Cardiovascular: Comment: Mast cell activation syndrome    (+) Dyslipidemia - -   -  - -                                      METS/Exercise Tolerance:     Hematologic:     (+)      anemia,          Musculoskeletal: Comment: Chronic fatigue syndrome      GI/Hepatic:       Renal/Genitourinary:     (+)       Nephrolithiasis ,       Endo:     (+)          thyroid problem, hypothyroidism,          "  Psychiatric/Substance Use: Comment: Chronic fatigue syndrome      Infectious Disease:       Malignancy:       Other:      (+)  , ,, other significant disability Wheelchair bound         Physical Exam    Airway        Mallampati: II   TM distance: > 3 FB    Mouth opening: > 3 cm    Respiratory Devices and Support         Dental       (+) Minor Abnormalities - some fillings, tiny chips      Cardiovascular   cardiovascular exam normal       Rhythm and rate: regular     Pulmonary   pulmonary exam normal        breath sounds clear to auscultation           OUTSIDE LABS:  CBC:   Lab Results   Component Value Date    WBC 5.9 09/10/2024    HGB 13.3 09/10/2024    HCT 40.7 09/10/2024     09/10/2024     BMP:   Lab Results   Component Value Date     09/10/2024    POTASSIUM 4.2 09/10/2024    CHLORIDE 104 09/10/2024    CO2 23 09/10/2024    BUN 13.0 09/10/2024    CR 0.67 09/10/2024    GLC 76 09/10/2024     COAGS: No results found for: \"PTT\", \"INR\", \"FIBR\"  POC: No results found for: \"BGM\", \"HCG\", \"HCGS\"  HEPATIC: No results found for: \"ALBUMIN\", \"PROTTOTAL\", \"ALT\", \"AST\", \"GGT\", \"ALKPHOS\", \"BILITOTAL\", \"BILIDIRECT\", \"BROCK\"  OTHER:   Lab Results   Component Value Date    LESTER 9.2 09/10/2024    TSH 3.38 07/24/2024    T4 1.52 07/24/2024       Anesthesia Plan    ASA Status:  3    NPO Status:  NPO Appropriate    Anesthesia Type: General.     - Airway: LMA   Induction: Propofol.           Consents            Postoperative Care       PONV prophylaxis: Ondansetron (or other 5HT-3), Dexamethasone or Solumedrol, Background Propofol Infusion     Comments:               Sharmila Brito MD    I have reviewed the pertinent notes and labs in the chart from the past 30 days and (re)examined the patient.  Any updates or changes from those notes are reflected in this note.                          # Asthma: noted on problem list       "

## 2024-10-04 ENCOUNTER — APPOINTMENT (OUTPATIENT)
Dept: GENERAL RADIOLOGY | Facility: CLINIC | Age: 59
End: 2024-10-04
Attending: UROLOGY
Payer: COMMERCIAL

## 2024-10-04 ENCOUNTER — HOSPITAL ENCOUNTER (OUTPATIENT)
Facility: CLINIC | Age: 59
Discharge: HOME | End: 2024-10-04
Attending: UROLOGY | Admitting: UROLOGY
Payer: COMMERCIAL

## 2024-10-04 ENCOUNTER — ANESTHESIA (OUTPATIENT)
Dept: SURGERY | Facility: CLINIC | Age: 59
End: 2024-10-04
Payer: COMMERCIAL

## 2024-10-04 VITALS
HEART RATE: 57 BPM | SYSTOLIC BLOOD PRESSURE: 112 MMHG | BODY MASS INDEX: 21.81 KG/M2 | DIASTOLIC BLOOD PRESSURE: 85 MMHG | TEMPERATURE: 96 F | HEIGHT: 59 IN | OXYGEN SATURATION: 97 % | RESPIRATION RATE: 10 BRPM | WEIGHT: 108.2 LBS

## 2024-10-04 DIAGNOSIS — Z46.6 ENCOUNTER FOR REMOVAL OF URETERAL STENT: ICD-10-CM

## 2024-10-04 DIAGNOSIS — N20.0 KIDNEY STONE ON LEFT SIDE: Primary | ICD-10-CM

## 2024-10-04 PROCEDURE — 250N000011 HC RX IP 250 OP 636: Performed by: STUDENT IN AN ORGANIZED HEALTH CARE EDUCATION/TRAINING PROGRAM

## 2024-10-04 PROCEDURE — 250N000011 HC RX IP 250 OP 636: Performed by: UROLOGY

## 2024-10-04 PROCEDURE — C2617 STENT, NON-COR, TEM W/O DEL: HCPCS | Performed by: UROLOGY

## 2024-10-04 PROCEDURE — 272N000002 HC OR SUPPLY OTHER OPNP: Performed by: UROLOGY

## 2024-10-04 PROCEDURE — 258N000003 HC RX IP 258 OP 636: Performed by: STUDENT IN AN ORGANIZED HEALTH CARE EDUCATION/TRAINING PROGRAM

## 2024-10-04 PROCEDURE — C1769 GUIDE WIRE: HCPCS | Performed by: UROLOGY

## 2024-10-04 PROCEDURE — 999N000141 HC STATISTIC PRE-PROCEDURE NURSING ASSESSMENT: Performed by: UROLOGY

## 2024-10-04 PROCEDURE — 250N000009 HC RX 250: Performed by: STUDENT IN AN ORGANIZED HEALTH CARE EDUCATION/TRAINING PROGRAM

## 2024-10-04 PROCEDURE — 710N000009 HC RECOVERY PHASE 1, LEVEL 1, PER MIN: Performed by: UROLOGY

## 2024-10-04 PROCEDURE — 710N000012 HC RECOVERY PHASE 2, PER MINUTE: Performed by: UROLOGY

## 2024-10-04 PROCEDURE — 272N000001 HC OR GENERAL SUPPLY STERILE: Performed by: UROLOGY

## 2024-10-04 PROCEDURE — 52356 CYSTO/URETERO W/LITHOTRIPSY: CPT | Performed by: NURSE ANESTHETIST, CERTIFIED REGISTERED

## 2024-10-04 PROCEDURE — 74420 UROGRAPHY RTRGR +-KUB: CPT | Mod: 26 | Performed by: UROLOGY

## 2024-10-04 PROCEDURE — 250N000011 HC RX IP 250 OP 636: Performed by: NURSE ANESTHETIST, CERTIFIED REGISTERED

## 2024-10-04 PROCEDURE — 258N000001 HC RX 258: Performed by: UROLOGY

## 2024-10-04 PROCEDURE — 999N000179 XR SURGERY CARM FLUORO LESS THAN 5 MIN W STILLS: Mod: TC

## 2024-10-04 PROCEDURE — C1747 HC OR ENDOSCOPE, SGL USE,URINARY TRACT, IMAGING/ILLUMINATION DEVICE: HCPCS | Performed by: UROLOGY

## 2024-10-04 PROCEDURE — 52356 CYSTO/URETERO W/LITHOTRIPSY: CPT | Performed by: STUDENT IN AN ORGANIZED HEALTH CARE EDUCATION/TRAINING PROGRAM

## 2024-10-04 PROCEDURE — 52356 CYSTO/URETERO W/LITHOTRIPSY: CPT | Mod: 22 | Performed by: UROLOGY

## 2024-10-04 PROCEDURE — 370N000017 HC ANESTHESIA TECHNICAL FEE, PER MIN: Performed by: UROLOGY

## 2024-10-04 PROCEDURE — 82365 CALCULUS SPECTROSCOPY: CPT | Performed by: UROLOGY

## 2024-10-04 PROCEDURE — 250N000009 HC RX 250: Performed by: UROLOGY

## 2024-10-04 PROCEDURE — 360N000077 HC SURGERY LEVEL 4, PER MIN: Performed by: UROLOGY

## 2024-10-04 PROCEDURE — 250N000013 HC RX MED GY IP 250 OP 250 PS 637: Performed by: UROLOGY

## 2024-10-04 PROCEDURE — 250N000025 HC SEVOFLURANE, PER MIN: Performed by: UROLOGY

## 2024-10-04 DEVICE — URETERAL STENT
Type: IMPLANTABLE DEVICE | Site: URETHRA | Status: NON-FUNCTIONAL
Brand: POLARIS™ ULTRA
Removed: 2024-10-09

## 2024-10-04 RX ORDER — SODIUM CHLORIDE, SODIUM LACTATE, POTASSIUM CHLORIDE, CALCIUM CHLORIDE 600; 310; 30; 20 MG/100ML; MG/100ML; MG/100ML; MG/100ML
INJECTION, SOLUTION INTRAVENOUS CONTINUOUS
Status: DISCONTINUED | OUTPATIENT
Start: 2024-10-04 | End: 2024-10-04 | Stop reason: HOSPADM

## 2024-10-04 RX ORDER — ONDANSETRON 2 MG/ML
4 INJECTION INTRAMUSCULAR; INTRAVENOUS EVERY 30 MIN PRN
Status: DISCONTINUED | OUTPATIENT
Start: 2024-10-04 | End: 2024-10-04 | Stop reason: HOSPADM

## 2024-10-04 RX ORDER — HYDROMORPHONE HCL IN WATER/PF 6 MG/30 ML
0.4 PATIENT CONTROLLED ANALGESIA SYRINGE INTRAVENOUS EVERY 5 MIN PRN
Status: DISCONTINUED | OUTPATIENT
Start: 2024-10-04 | End: 2024-10-04 | Stop reason: HOSPADM

## 2024-10-04 RX ORDER — ONDANSETRON 4 MG/1
4 TABLET, ORALLY DISINTEGRATING ORAL EVERY 30 MIN PRN
Status: DISCONTINUED | OUTPATIENT
Start: 2024-10-04 | End: 2024-10-04 | Stop reason: HOSPADM

## 2024-10-04 RX ORDER — ONDANSETRON 2 MG/ML
INJECTION INTRAMUSCULAR; INTRAVENOUS PRN
Status: DISCONTINUED | OUTPATIENT
Start: 2024-10-04 | End: 2024-10-04

## 2024-10-04 RX ORDER — PROPOFOL 10 MG/ML
INJECTION, EMULSION INTRAVENOUS CONTINUOUS PRN
Status: DISCONTINUED | OUTPATIENT
Start: 2024-10-04 | End: 2024-10-04

## 2024-10-04 RX ORDER — IOPAMIDOL 612 MG/ML
INJECTION, SOLUTION INTRATHECAL PRN
Status: DISCONTINUED | OUTPATIENT
Start: 2024-10-04 | End: 2024-10-04 | Stop reason: HOSPADM

## 2024-10-04 RX ORDER — CEFAZOLIN SODIUM/WATER 2 G/20 ML
2 SYRINGE (ML) INTRAVENOUS SEE ADMIN INSTRUCTIONS
Status: DISCONTINUED | OUTPATIENT
Start: 2024-10-04 | End: 2024-10-04 | Stop reason: HOSPADM

## 2024-10-04 RX ORDER — DEXAMETHASONE SODIUM PHOSPHATE 4 MG/ML
INJECTION, SOLUTION INTRA-ARTICULAR; INTRALESIONAL; INTRAMUSCULAR; INTRAVENOUS; SOFT TISSUE PRN
Status: DISCONTINUED | OUTPATIENT
Start: 2024-10-04 | End: 2024-10-04

## 2024-10-04 RX ORDER — CEFAZOLIN SODIUM/WATER 2 G/20 ML
2 SYRINGE (ML) INTRAVENOUS
Status: COMPLETED | OUTPATIENT
Start: 2024-10-04 | End: 2024-10-04

## 2024-10-04 RX ORDER — SODIUM CHLORIDE, SODIUM LACTATE, POTASSIUM CHLORIDE, CALCIUM CHLORIDE 600; 310; 30; 20 MG/100ML; MG/100ML; MG/100ML; MG/100ML
INJECTION, SOLUTION INTRAVENOUS CONTINUOUS PRN
Status: DISCONTINUED | OUTPATIENT
Start: 2024-10-04 | End: 2024-10-04

## 2024-10-04 RX ORDER — NALOXONE HYDROCHLORIDE 0.4 MG/ML
0.1 INJECTION, SOLUTION INTRAMUSCULAR; INTRAVENOUS; SUBCUTANEOUS
Status: DISCONTINUED | OUTPATIENT
Start: 2024-10-04 | End: 2024-10-04 | Stop reason: HOSPADM

## 2024-10-04 RX ORDER — ACETAMINOPHEN 325 MG/1
975 TABLET ORAL ONCE
Status: COMPLETED | OUTPATIENT
Start: 2024-10-04 | End: 2024-10-04

## 2024-10-04 RX ORDER — FUROSEMIDE 10 MG/ML
INJECTION INTRAMUSCULAR; INTRAVENOUS PRN
Status: DISCONTINUED | OUTPATIENT
Start: 2024-10-04 | End: 2024-10-04

## 2024-10-04 RX ORDER — DEXAMETHASONE SODIUM PHOSPHATE 4 MG/ML
4 INJECTION, SOLUTION INTRA-ARTICULAR; INTRALESIONAL; INTRAMUSCULAR; INTRAVENOUS; SOFT TISSUE
Status: DISCONTINUED | OUTPATIENT
Start: 2024-10-04 | End: 2024-10-04 | Stop reason: HOSPADM

## 2024-10-04 RX ORDER — PROPOFOL 10 MG/ML
INJECTION, EMULSION INTRAVENOUS PRN
Status: DISCONTINUED | OUTPATIENT
Start: 2024-10-04 | End: 2024-10-04

## 2024-10-04 RX ORDER — LIDOCAINE HYDROCHLORIDE 20 MG/ML
INJECTION, SOLUTION INFILTRATION; PERINEURAL PRN
Status: DISCONTINUED | OUTPATIENT
Start: 2024-10-04 | End: 2024-10-04

## 2024-10-04 RX ORDER — FENTANYL CITRATE 50 UG/ML
50 INJECTION, SOLUTION INTRAMUSCULAR; INTRAVENOUS EVERY 5 MIN PRN
Status: DISCONTINUED | OUTPATIENT
Start: 2024-10-04 | End: 2024-10-04 | Stop reason: HOSPADM

## 2024-10-04 RX ORDER — KETOROLAC TROMETHAMINE 30 MG/ML
INJECTION, SOLUTION INTRAMUSCULAR; INTRAVENOUS PRN
Status: DISCONTINUED | OUTPATIENT
Start: 2024-10-04 | End: 2024-10-04

## 2024-10-04 RX ORDER — CIPROFLOXACIN 500 MG/1
500 TABLET, FILM COATED ORAL ONCE
Qty: 1 TABLET | Refills: 0 | Status: SHIPPED | OUTPATIENT
Start: 2024-10-04 | End: 2024-10-04

## 2024-10-04 RX ORDER — FENTANYL CITRATE 50 UG/ML
INJECTION, SOLUTION INTRAMUSCULAR; INTRAVENOUS PRN
Status: DISCONTINUED | OUTPATIENT
Start: 2024-10-04 | End: 2024-10-04

## 2024-10-04 RX ORDER — ACETAMINOPHEN 650 MG/1
650 SUPPOSITORY RECTAL ONCE
Status: COMPLETED | OUTPATIENT
Start: 2024-10-04 | End: 2024-10-04

## 2024-10-04 RX ORDER — HYDROMORPHONE HCL IN WATER/PF 6 MG/30 ML
0.2 PATIENT CONTROLLED ANALGESIA SYRINGE INTRAVENOUS EVERY 5 MIN PRN
Status: DISCONTINUED | OUTPATIENT
Start: 2024-10-04 | End: 2024-10-04 | Stop reason: HOSPADM

## 2024-10-04 RX ORDER — FENTANYL CITRATE 50 UG/ML
25 INJECTION, SOLUTION INTRAMUSCULAR; INTRAVENOUS EVERY 5 MIN PRN
Status: DISCONTINUED | OUTPATIENT
Start: 2024-10-04 | End: 2024-10-04 | Stop reason: HOSPADM

## 2024-10-04 RX ADMIN — FUROSEMIDE 10 MG: 10 INJECTION, SOLUTION INTRAVENOUS at 13:24

## 2024-10-04 RX ADMIN — SODIUM CHLORIDE, POTASSIUM CHLORIDE, SODIUM LACTATE AND CALCIUM CHLORIDE: 600; 310; 30; 20 INJECTION, SOLUTION INTRAVENOUS at 12:09

## 2024-10-04 RX ADMIN — PHENYLEPHRINE HYDROCHLORIDE 150 MCG: 10 INJECTION INTRAVENOUS at 12:37

## 2024-10-04 RX ADMIN — PROPOFOL 25 MCG/KG/MIN: 10 INJECTION, EMULSION INTRAVENOUS at 12:22

## 2024-10-04 RX ADMIN — Medication 2 G: at 12:09

## 2024-10-04 RX ADMIN — FENTANYL CITRATE 50 MCG: 50 INJECTION INTRAMUSCULAR; INTRAVENOUS at 12:17

## 2024-10-04 RX ADMIN — PHENYLEPHRINE HYDROCHLORIDE 100 MCG: 10 INJECTION INTRAVENOUS at 12:29

## 2024-10-04 RX ADMIN — PHENYLEPHRINE HYDROCHLORIDE 100 MCG: 10 INJECTION INTRAVENOUS at 13:04

## 2024-10-04 RX ADMIN — ONDANSETRON 4 MG: 2 INJECTION INTRAMUSCULAR; INTRAVENOUS at 13:24

## 2024-10-04 RX ADMIN — FENTANYL CITRATE 50 MCG: 50 INJECTION INTRAMUSCULAR; INTRAVENOUS at 12:33

## 2024-10-04 RX ADMIN — PHENYLEPHRINE HYDROCHLORIDE 100 MCG: 10 INJECTION INTRAVENOUS at 12:51

## 2024-10-04 RX ADMIN — LIDOCAINE HYDROCHLORIDE 60 MG: 20 INJECTION, SOLUTION INFILTRATION; PERINEURAL at 12:17

## 2024-10-04 RX ADMIN — DEXAMETHASONE SODIUM PHOSPHATE 4 MG: 4 INJECTION, SOLUTION INTRA-ARTICULAR; INTRALESIONAL; INTRAMUSCULAR; INTRAVENOUS; SOFT TISSUE at 12:25

## 2024-10-04 RX ADMIN — PROPOFOL 150 MG: 10 INJECTION, EMULSION INTRAVENOUS at 12:17

## 2024-10-04 RX ADMIN — KETOROLAC TROMETHAMINE 30 MG: 30 INJECTION, SOLUTION INTRAMUSCULAR at 13:24

## 2024-10-04 RX ADMIN — ACETAMINOPHEN 975 MG: 325 TABLET ORAL at 10:59

## 2024-10-04 RX ADMIN — PHENYLEPHRINE HYDROCHLORIDE 100 MCG: 10 INJECTION INTRAVENOUS at 12:41

## 2024-10-04 ASSESSMENT — ACTIVITIES OF DAILY LIVING (ADL)
ADLS_ACUITY_SCORE: 37
ADLS_ACUITY_SCORE: 37
ADLS_ACUITY_SCORE: 33
ADLS_ACUITY_SCORE: 35
ADLS_ACUITY_SCORE: 37
ADLS_ACUITY_SCORE: 37

## 2024-10-04 NOTE — ANESTHESIA CARE TRANSFER NOTE
Patient: Nora Vargas    Procedure: Procedure(s):  CYSTOSCOPY, LEFT URETERAL STENT REMOVAL, LEFT RETROGRADE PYELOGRAM, LEFT URETEROSCOPY WITH LASER LITHOTRIOPSY AND BASKET REMOVAL OF STONES, LEFT STEERABLE VACUUM SUCTION, LEFT URETERAL STENT PLACEMENT, RIGHT URETERAL STENT REMOVAL       Diagnosis: Calculus of left kidney [N20.0]  Right kidney stone [N20.0]  Diagnosis Additional Information: No value filed.    Anesthesia Type:   General     Note:    Oropharynx: oropharynx clear of all foreign objects and spontaneously breathing  Level of Consciousness: awake and drowsy  Oxygen Supplementation: face mask  Level of Supplemental Oxygen (L/min / FiO2): 6  Independent Airway: airway patency satisfactory and stable  Dentition: dentition unchanged  Vital Signs Stable: post-procedure vital signs reviewed and stable  Report to RN Given: handoff report given  Patient transferred to: PACU  Comments: At end of procedure, spontaneous respirations, adequate tidal volumes, followed commands to voice, LMA removed atraumatically, oropharynx suctioned, airway patent after LMA removal. Oxygen via facemask at 6 liters per minute to PACU. Oxygen tubing connected to wall O2 in PACU, SpO2, NiBP, and EKG monitors and alarms on and functioning, Rand Hugger warmer connected to patient gown, report on patient's clinical status given to PACU RN, RN questions answered.      Handoff Report: Identifed the Patient, Identified the Reponsible Provider, Reviewed the pertinent medical history, Discussed the surgical course, Reviewed Intra-OP anesthesia mangement and issues during anesthesia, Set expectations for post-procedure period and Allowed opportunity for questions and acknowledgement of understanding      Vitals:  Vitals Value Taken Time   /74 10/04/24 1337   Temp     Pulse 60 10/04/24 1339   Resp 13 10/04/24 1339   SpO2 100 % 10/04/24 1339   Vitals shown include unfiled device data.    Electronically Signed By: Candace Medina  APRN CRNA  October 4, 2024  1:41 PM

## 2024-10-04 NOTE — OR NURSING
Discharge instructions reviewed with Mrs. Vargas, and with her  Efe by telephone.  Both verbalize understanding of discharge instructions.   Awaiting arrival of /.

## 2024-10-04 NOTE — DISCHARGE INSTRUCTIONS
POSTOPERATIVE INSTRUCTIONS    Diagnosis-------------------------------   LEFT kidney stones    Procedure-------------------------------  Procedure(s) (LRB):  CYSTOSCOPY, LEFT URETERAL STENT REMOVAL, LEFT RETROGRADE PYELOGRAM, LEFT URETEROSCOPY WITH LASER LITHOTRIOPSY AND BASKET REMOVAL OF STONES, LEFT STEERABLE VACUUM SUCTION, LEFT URETERAL STENT PLACEMENT, RIGHT URETERAL STENT REMOVAL (Bilateral)      Findings--------------------------------  LEFT kidney stones removed    Home-going instructions-----------------         Activity Limitation:     - No driving or operating heavy machinery while on narcotic pain medication.     FOLLOW THESE INSTRUCTIONS AS INDICATED BELOW:  - Observe operative area for signs of excessive bleeding.  - You may shower.  - Increase fluid intake to promote clear urine.  - Resume usual diet as tolerated    What to expect while recovering-----------  - You may experience some intermittent bleeding that makes your urine pink or cherry colored. This is normal.  - However, if you are unable to urinate, passing large amount of clots, have desi blood in your urine, or have a temperature >101 degrees, call the urology nurse on call, or present to your nearest emergency department.  - You are encouraged to walk daily, and have no activity restrictions.   - A URETERAL STENT has been placed that allows urine to flow unobstructed from your kidney into your bladder.  The stent has a curl in the kidney and a curl in the bladder.  The curl in the bladder can cause some urgency and frequency of urination as well as some mild blood in the urine.  The curl in the kidney can cause some mild flank discomfort.  This may be more noticeable when you urinate.  A URETERAL STENT is meant to be left in temporarily.  It must be removed or changed no later than 3 months after it's insertion.  If it's not removed it can result in stone overgrowth on the stent that can cause pain, infection, and can be very difficult to  remove.      Discharge Medications/instructions:   - Flomax (tamsulosin) to be taken daily until stent is removed  - Take Tylenol 1000mg every 6 hours for pain  - Take Ibuprofen 600mg every 6 hours as needed for additional pain control  - Take Oxycodone 5mg every 4-6 hours only for break through pain  - Take Colace while taking Oxycodone to prevent constipation      Questions/concerns------------------------  [facility]: [phone]  [facility]: [phone]    Future appointments  You are scheduled for follow up with Dr. Erazo on 10/9/2024 for ureteral stent removal.      Michael Erazo MD           Today you were given 975 mg of Tylenol at 11:00am. The recommended daily maximum dose is 4000 mg.     Today you received Toradol, an antiinflammatory medication similar to Ibuprofen.  You should not take other antiinflammatory medication, such as Ibuprofen, Motrin, Advil, Aleve, Naprosyn, etc until 7:30 pm      Same Day Surgery Discharge Instructions for  Sedation and General Anesthesia     It's not unusual to feel dizzy, light-headed or faint for up to 24 hours after surgery or while taking pain medication.  If you have these symptoms: sit for a few minutes before standing and have someone assist you when you get up to walk or use the bathroom.    You should rest and relax for the next 24 hours. We recommend you make arrangements to have an adult stay with you for at least 24 hours after your discharge.  Avoid hazardous and strenuous activity.    DO NOT DRIVE any vehicle or operate mechanical equipment for 24 hours following the end of your surgery.  Even though you may feel normal, your reactions may be affected by the medication you have received.    Do not drink alcoholic beverages for 24 hours following surgery.     Slowly progress to your regular diet as you feel able. It's not unusual to feel nauseated and/or vomit after receiving anesthesia.  If you develop these symptoms,  drink clear liquids (apple juice, ginger ale, broth, 7-up, etc. ) until you feel better.  If your nausea and vomiting persists for 24 hours, please notify your surgeon.      All narcotic pain medications, along with inactivity and anesthesia, can cause constipation. Drinking plenty of liquids and increasing fiber intake will help.    For any questions of a medical nature, call your surgeon.    Do not make important decisions for 24 hours.    If you had general anesthesia, you may have a sore throat for a couple of days related to the breathing tube used during surgery.  You may use Cepacol lozenges to help with this discomfort.  If it worsens or if you develop a fever, contact your surgeon.     If you feel your pain is not well managed with the pain medications prescribed by your surgeon, please contact your surgeon's office to let them know so they can address your concerns.          **If you have questions or concerns about your procedure,   call Dr. Erazo at [phone]**

## 2024-10-04 NOTE — ANESTHESIA PROCEDURE NOTES
Airway       Patient location during procedure: OR  Staff -        CRNA: Candace Medina APRN CRNA       Other Anesthesia Staff: Morro Vogel       Performed By: SRNA and with CRNAs       Procedure performed by resident/fellow/CRNA in presence of a teaching physician.    Consent for Airway        Urgency: elective  Indications and Patient Condition       Indications for airway management: austen-procedural       Induction type:intravenous       Mask difficulty assessment: 0 - not attempted    Final Airway Details       Final airway type: supraglottic airway    Supraglottic Airway Details        Type: LMA       Brand: Ambu AuraGain       LMA size: 4    Post intubation assessment        Placement verified by: capnometry, equal breath sounds and chest rise        Number of attempts at approach: 2       Number of other approaches attempted: 0       Secured with: tape       Ease of procedure: easy       Dentition: Intact and Unchanged

## 2024-10-04 NOTE — ANESTHESIA POSTPROCEDURE EVALUATION
Patient: Nora Vargas    Procedure: Procedure(s):  CYSTOSCOPY, LEFT URETERAL STENT REMOVAL, LEFT RETROGRADE PYELOGRAM, LEFT URETEROSCOPY WITH LASER LITHOTRIOPSY AND BASKET REMOVAL OF STONES, LEFT STEERABLE VACUUM SUCTION, LEFT URETERAL STENT PLACEMENT, RIGHT URETERAL STENT REMOVAL       Anesthesia Type:  General    Note:  Disposition: Outpatient   Postop Pain Control: Uneventful            Sign Out: Well controlled pain   PONV: No   Neuro/Psych: Uneventful            Sign Out: Acceptable/Baseline neuro status   Airway/Respiratory: Uneventful            Sign Out: Acceptable/Baseline resp. status   CV/Hemodynamics: Uneventful            Sign Out: Acceptable CV status; No obvious hypovolemia; No obvious fluid overload   Other NRE: NONE   DID A NON-ROUTINE EVENT OCCUR? No           Last vitals:  Vitals Value Taken Time   /85 10/04/24 1430   Temp 35.6  C (96  F) 10/04/24 1342   Pulse 71 10/04/24 1441   Resp 14 10/04/24 1441   SpO2 97 % 10/04/24 1440   Vitals shown include unfiled device data.    Electronically Signed By: Sharmila Brito MD  October 4, 2024  2:47 PM

## 2024-10-04 NOTE — OP NOTE
OPERATIVE REPORT  DATE OF SURGERY: 10/04/24  LOCATION OF SURGERY: [facility]  PREOPERATIVE DIAGNOSIS:  (N20.0) Kidney stone on left side  (primary encounter diagnosis)  (Z46.6) Encounter for removal of ureteral stent  POSTOPERATIVE DIAGNOSIS: (N20.0) Kidney stone on left side  (primary encounter diagnosis)  (Z46.6) Encounter for removal of ureteral stent     START TIME: 12:34 PM  END TIME: 1:22 PM    PROCEDURE PERFORMED:   1. Cystoscopy and RIGHT ureteral stent removal  2. Cystoscopy and LEFT ureteral stent removal  3. LEFT retrograde pyelogram  4. LEFT ureteroscopy with laser lithotripsy  5. LEFT ureteroscopy with basketing of stones  6. LEFT Ureteroscopy with steerable vacuum suction, 93947 - MODIFIER 22  7. LEFT JJ stent placement  8. <1hr physician fluoroscopy time      STAFF SURGEON: Michael Erazo MD  ANESTHESIA: General.   ESTIMATED BLOOD LOSS: 2 mL.   DRAINS AND TUBES: LEFT 6fr x 22cm ureteral stent, 16fr briggs catheter  COMPLICATIONS: None.   DISPOSITION: PACU.   SPECIMENS OBTAINED:   ID Type Source Tests Collected by Time Destination   A : Left Kidney Stones Calculus/Stone Kidney, Left STONE ANALYSIS Michael Erazo MD 10/4/2024  1:24 PM       SIGNIFICANT FINDINGS: Cystoscopy with removal of the previously placed right ureteral stent.  Removal of the left ureteral stent and explore ureteroscopy with laser lithotripsy, basketing of stones, and steerable vacuum suction with removal of all stones from the left kidney.  Left ureteral stent placed.    MODIFIER 22 JUSTIFICATION: This was a challenging case given the large number of stones throughout the left kidney which required laser lithotripsy, basket removal of stones, and steerable vacuum suction which required significant additional surgeon time and effort to stay case.     HISTORY OF PRESENT ILLNESS: Nora Vargas is a 58 year old female with bilateral nephrolithiasis.  She was noted to have significant stone burden in the left kidney and was  counseled on a staged approach.  She underwent right ureteroscopy and bilateral ureteral stent placement on 9/18/2024 and returns today for left-sided ureteroscopy with steerable vacuum suction.    OPERATION PERFORMED:   Informed consent was obtained and the patient was brought to the operating room where general anesthesia was induced. The patient was given appropriate preoperative antibiotics and positioned supine. The patient was then repositioned in dorsal lithotomy with all pressure points padded. We then performed a timeout, verifying the correct patient's site and procedure to be performed.    A 22 [place] Korean cystoscope was inserted atraumatically into the bladder.  The previously placed right ureteral stent was identified and grasped and withdrawn to the urethral meatus and removed in entirety.  The cystoscope was replaced into the bladder and the previously placed left ureteral stent was grasped and withdrawn to the urethral meatus.  This was then cannulated with a 0.035 sensor wire which was advanced up to the renal pelvis under fluoroscopic guidance and the stent was removed.  A 10 [place] dual-lumen catheter was advanced over the wire to the mid ureter and a gentle retrograde pyelogram was performed outlining the collecting system.  A Super Stiff wire was advanced up to the renal pelvis and the dual-lumen catheter was removed.  A 12-14 South Korean by 36 cm ureteral access sheath was advanced over the wire to the proximal ureter/UPJ under fluoroscopic guidance and the inner stylette and wire were removed.  The flexible CVAC steerable vacuum suction ureteroscope was advanced into the renal pelvis and complete pyeloscopy was performed.  Tones were encountered in the upper, mid, and lower poles of the kidney.  The larger stones were dusted and fragmented with holmium laser lithotripsy.  A few larger fragments were basketed and removed.  The stone dust and smaller fragments were then removed with steerable vacuum  suction, modifier 22.  Ultimately, all stone fragments were removed.  The ureteroscope and access sheath were removed en bloc with no clear evidence of ureteral injury.  A new 6 Liberian by 22 cm JJ ureteral stent was advanced over the wire with good curl noted in the renal pelvis and in the bladder fluoroscopically.  A 16 Liberian Paz catheter was placed.  She received 10 mg IV Lasix and 15 mg IV Toradol.  She was emerged from anesthesia and taken to the recovery room in stable condition.    Michael Erazo MD   Urology  [facility]  Clinic Phone [phone]

## 2024-10-09 ENCOUNTER — OFFICE VISIT (OUTPATIENT)
Dept: UROLOGY | Facility: CLINIC | Age: 59
End: 2024-10-09
Payer: COMMERCIAL

## 2024-10-09 VITALS
HEIGHT: 59 IN | BODY MASS INDEX: 21.77 KG/M2 | HEART RATE: 73 BPM | DIASTOLIC BLOOD PRESSURE: 78 MMHG | OXYGEN SATURATION: 99 % | WEIGHT: 108 LBS | SYSTOLIC BLOOD PRESSURE: 116 MMHG

## 2024-10-09 DIAGNOSIS — N20.0 RIGHT KIDNEY STONE: ICD-10-CM

## 2024-10-09 DIAGNOSIS — N20.0 CALCULUS OF LEFT KIDNEY: ICD-10-CM

## 2024-10-09 DIAGNOSIS — Z46.6 ENCOUNTER FOR REMOVAL OF URETERAL STENT: Primary | ICD-10-CM

## 2024-10-09 LAB
APPEARANCE STONE: NORMAL
COMPN STONE: NORMAL
SPECIMEN WT: 22 MG

## 2024-10-09 PROCEDURE — 52310 CYSTOSCOPY AND TREATMENT: CPT | Performed by: UROLOGY

## 2024-10-09 RX ORDER — LIDOCAINE HYDROCHLORIDE 20 MG/ML
JELLY TOPICAL ONCE
Status: COMPLETED | OUTPATIENT
Start: 2024-10-09 | End: 2024-10-09

## 2024-10-09 RX ADMIN — LIDOCAINE HYDROCHLORIDE 5 ML: 20 JELLY TOPICAL at 14:08

## 2024-10-09 ASSESSMENT — PAIN SCALES - GENERAL: PAINLEVEL: NO PAIN (0)

## 2024-10-09 NOTE — PROGRESS NOTES
CYSTOSCOPY AND URETERAL STENT REMOVAL PROCEDURE NOTE:    Nora Vargas is a 59 year old female who presents with ureteral stent for a cystoscopy and ureteral stent removal.    Pt ID verified with patient: Yes     Procedure verified with patient: Yes     Procedure confirmed with physician and support staff: Yes     Consent confirmed with physician and support staff.    Sign In:  History and Physical Exam reviewed  Primary Diagnosis: ureteral stent   Informed Consent Discussed: Yes   Sign in Communication: Yes   Time Out:  Team Confirms the Correct Patient, Correct Procedure; Yes , Correct Site and Site Marking, Correct Position (if applicable).  Affirmation of Time Out: Yes   Sign Out:  Sign Out Discussion: Yes     Nora Vargas is a 59 year old female with an indwelling ureteral stent in need of removal.    CYSTOSCOPY PROCEDURE:  After sterile preparation and draping of the patient,  a 17-Gambian flexible cystoscope was introduced via the urethra.  It was passed without difficulty into the bladder.  The urethra was open without evidence of stricture.  The ureteral orifices were orthotopic.  The double J stent was seen coming out the left side.  It was grasped with an alligator forceps and extracted intact without difficulty.  The patient tolerated the procedure well    A/P Successful stent removal  Prophylactic antibiotic ordered previously   Stone prevention counseling provided today  - Nephrology referral metabolic work up and stone prevention    Watch for any new onset fevers, signs of UTI.  May expect some pain after removal.  If this is severe, or last many hours, you may need to return for replacement of stent.    Michael Erazo MD   Urology  [facility]

## 2024-10-09 NOTE — LETTER
10/9/2024       RE: Nora Vargas  [address]  [place] [address]     Dear Colleague,    Thank you for referring your patient, Nora Vargas, to the [facility] [place] at [facility]. Please see a copy of my visit note below.    CYSTOSCOPY AND URETERAL STENT REMOVAL PROCEDURE NOTE:    Nora Vargas is a 59 year old female who presents with ureteral stent for a cystoscopy and ureteral stent removal.    Pt ID verified with patient: Yes     Procedure verified with patient: Yes     Procedure confirmed with physician and support staff: Yes     Consent confirmed with physician and support staff.    Sign In:  History and Physical Exam reviewed  Primary Diagnosis: ureteral stent   Informed Consent Discussed: Yes   Sign in Communication: Yes   Time Out:  Team Confirms the Correct Patient, Correct Procedure; Yes , Correct Site and Site Marking, Correct Position (if applicable).  Affirmation of Time Out: Yes   Sign Out:  Sign Out Discussion: Yes     Nora Vargas is a 59 year old female with an indwelling ureteral stent in need of removal.    CYSTOSCOPY PROCEDURE:  After sterile preparation and draping of the patient,  a 17-Latvian flexible cystoscope was introduced via the urethra.  It was passed without difficulty into the bladder.  The urethra was open without evidence of stricture.  The ureteral orifices were orthotopic.  The double J stent was seen coming out the left side.  It was grasped with an alligator forceps and extracted intact without difficulty.  The patient tolerated the procedure well    A/P Successful stent removal  Prophylactic antibiotic ordered previously   Stone prevention counseling provided today  - Nephrology referral metabolic work up and stone prevention    Watch for any new onset fevers, signs of UTI.  May expect some pain after removal.  If this is severe, or last many hours, you may need to return for replacement of  stent.    Michael Erazo MD   Urology  [facility]       Again, thank you for allowing me to participate in the care of your patient.      Sincerely,    Michael Erazo MD

## 2024-10-09 NOTE — NURSING NOTE
Chief Complaint   Patient presents with    Kidney Stone Related     In office stent out    Prior to the start of the procedure and with procedural staff participation, I verbally confirmed the patient s identity using two indicators, relevant allergies, that the procedure was appropriate and matched the consent or emergent situation, and that the correct equipment/implants were available. Immediately prior to starting the procedure I conducted the Time Out with the procedural staff and re-confirmed the patient s name, procedure, and site/side. I have wiped the patient off with the povidone-Iodine solution, draped them,  used Lidocaine hydrochloride jelly, and instilled sterile water into the bladder. (The Joint Commission universal protocol was followed.)  Yes    Sedation (Moderate or Deep): None   5mL 2% lidocaine hydrochloride Urojet instilled into urethra.    NDC# [address]-3  Lot #: [id number]  Expiration Date:  5-26   Eemlyn Davis LPN

## 2024-12-16 ENCOUNTER — VIRTUAL VISIT (OUTPATIENT)
Dept: ENDOCRINOLOGY | Facility: CLINIC | Age: 59
End: 2024-12-16
Attending: PHYSICIAN ASSISTANT
Payer: COMMERCIAL

## 2024-12-16 VITALS — HEIGHT: 59 IN | BODY MASS INDEX: 21.77 KG/M2 | WEIGHT: 108 LBS

## 2024-12-16 DIAGNOSIS — E03.9 HYPOTHYROIDISM, UNSPECIFIED TYPE: ICD-10-CM

## 2024-12-16 DIAGNOSIS — M81.8 OTHER OSTEOPOROSIS WITHOUT CURRENT PATHOLOGICAL FRACTURE: ICD-10-CM

## 2024-12-16 PROCEDURE — 99205 OFFICE O/P NEW HI 60 MIN: CPT | Mod: 95 | Performed by: INTERNAL MEDICINE

## 2024-12-16 ASSESSMENT — PAIN SCALES - GENERAL: PAINLEVEL_OUTOF10: NO PAIN (0)

## 2024-12-16 NOTE — LETTER
12/16/2024       RE: Nora Vargas  [address]  [place] [address]     Dear Colleague,    Thank you for referring your patient, Nora Vargas, to the [facility] ENDOCRINOLOGY CLINIC [place] at [facility]. Please see a copy of my visit note below.    ENDOCRINOLOGY VIDEO VISIT NEW        HISTORY OF PRESENT ILLNESS    Nora Vargas is a 59 year old female who is being evaluated via a billable video visit. The patient is seen in consultation at the request of LINDA Rader for hypothyroidism and osteoporosis.    I reviewed outside records in care everywhere.  The patient was previously followed in diabetes and endocrinology at [facility] ([facility]).    1.  Hypothyroidism.  Patient recalls that her primary care physician in [place] diagnosed hypothyroidism in 2018--she notes that antithyroid antibodies were positive and she was diagnosed with Hashimoto's thyroiditis.  She started liothyronine about 1 year after diagnosis.    According to outside endocrinology records, has been treated with levothyroxine 50 mcg daily and liothyronine 7.5 mcg daily (both taken 6 days a week)--patient confirms this regimen and notes that she gets liothyronine from compounding pharmacy at 7.5 mcg dose.    Has chronic fatigue syndrome and has not noted a significant change in her symptoms with the addition of liothyronine.  She has chronic constipation which she manages with high-fiber diet and consistent hydration.    2.  Osteoporosis.  According to outside records in care everywhere, diagnosed with osteoporosis in 2/2020 (lumbar spine T-score -3.5, left femoral neck T-score -2.4).    Testing at the time of diagnosis (reviewed in Care Everywhere) revealed normal serum protein electrophoresis, PTH, phosphorus, vitamin D and suppressed TSH.    Fracture history: None, no height loss in adulthood.    Treatment history:  -Alendronate prescribed  8/2020.  -Transitioned to Reclast in 2/2023.  From what I can gather from records in Care Everywhere, the patient received Reclast infusion on 2/10/2023 and 2/14/2024.  She has tolerated Reclast without side effect.    Most recent DXA scan on 12/2/2022 at [facility] (images are not available) showed the following:  -L1-L4 T-score -3.4  -Left femoral neck T-score -2.6, left total hip T-score -2.1  -Right femoral neck T-score -2.4, right total hip T-score -1.8    Calcium/vitamin D intake:  - Diet - Cheese daily, milk daily with breakfast (8 ounces at least). Has kale daily.  - Supplements - None  - On famotidine  - Vitamin D3 3000 IU daily    No history of hypercalcemia.  The patient has history of nephrolithiasis since 2018, recurrent.  She has required lithotripsy.  Stone analysis performed on 10/4/2024 revealed calcium oxalate stone; Stone analysis from 9/18/2024 also revealed calcium oxalate stone. Has had urine studies through urology; was most recently referred to nephrology by her urologist for workup of metabolic causes of stone formation.    No known history of malabsorptive disorder; no history of GI surgery; no chronic diarrhea.    Has used inhaled steroids in the past for 3-4 years; no history of long-term systemic glucocorticoid therapy.  Approximate age at menopause: Around age 49.  No HRT.    Never tobacco use.  Rare alcohol use.  Limited physical activity due to chronic fatigue syndrome.     Mother and older sister with osteoporosis.  No parental hip fracture history.    Has regular dentist appointments. Not anticipating invasive dental surgery.    No history of radiation therapy involving bone. No personal or family history of osteosarcoma.    No history of coronary artery disease, myocardial infarction or CVA.    History of muscle weakness, patient notes she was screened for myasthenia gravis with negative testing. Has mast cell activation syndrome.    Pertinent Social History: , with children.   Moved back to [place] from [place] in June 2024. On SSDI, previously worked as a writer.     PAST MEDICAL HISTORY  Past Medical History:   Diagnosis Date     Chronic fatigue syndrome      Hashimoto's thyroiditis      Mast cell activation syndrome (H)      Osteoporosis        MEDICATIONS  Current Outpatient Medications   Medication Sig Dispense Refill     albuterol (PROAIR HFA/PROVENTIL HFA/VENTOLIN HFA) 108 (90 Base) MCG/ACT inhaler Inhale 1-2 puffs into the lungs every 4 hours as needed       budesonide-formoterol (BREYNA) 160-4.5 MCG/ACT Inhaler Inhale 2 puffs into the lungs two times daily       cetirizine (ZYRTEC) 10 MG tablet Take 10 mg by mouth daily       cholecalciferol 125 MCG (5000 UT) CAPS        Cobalamin Combinations (B12 FOLATE) 800-800 MCG CAPS        docusate sodium (COLACE) 100 MG tablet Take 1 tablet (100 mg) by mouth daily. 60 tablet 1     famotidine (PEPCID) 20 MG tablet Take 1 tablet (20 mg) by mouth 2 times daily       fexofenadine (ALLEGRA ALLERGY) 180 MG tablet Take 1 tablet by mouth daily       levothyroxine (SYNTHROID/LEVOTHROID) 50 MCG tablet Take 1 tablet daily, skip Sundays 90 tablet 3     LIOTHYRONINE SODIUM PO Take 7.5 mcg by mouth daily Skip Sundays--gets from compounding pharmacy       [facility] Products (ATRANTIL) CAPS        Multiple Vitamin (MULTIVITAMIN ADULT PO) Take 1 tablet by mouth daily       naltrexone 1.5 MG capsule Take 9 mg by mouth at bedtime       oxyBUTYnin ER (DITROPAN XL) 5 MG 24 hr tablet Take 1 tablet (5 mg) by mouth daily. Take daily until your stent is removed. 30 tablet 1     potassium citrate (UROCIT-K) 10 MEQ (1080 MG) CR tablet Take 2 tablets (20 mEq) by mouth 2 times daily. 360 tablet 1     Quercetin 500 MG CAPS 800 mg by oral route.       tamsulosin (FLOMAX) 0.4 MG capsule Take 1 capsule (0.4 mg) by mouth daily. 30 capsule 0     traZODone (DESYREL) 150 MG tablet Take 1 tablet (150 mg) by mouth at bedtime 90 tablet 1     WELCHOL 625 MG tablet Take 625 mg by  "mouth every 24 hours       zoledronic acid (RECLAST) 5 MG/100ML SOLN infusion Inject 100 mLs (5 mg) into the vein         Allergies, family, and social history were reviewed and documented as needed in EHR.     REVIEW OF SYSTEMS  A focused ROS was performed, with pertinent positives and negatives as noted in the HPI.    PHYSICAL EXAM  Ht 1.499 m (4' 11\")   Wt 49 kg (108 lb)   LMP  (LMP Unknown)   BMI 21.81 kg/m    Body mass index is 21.81 kg/m .  Constitutional: Patient is alert, oriented and appears in no acute distress.  Eyes: Eyes grossly normal to inspection, EOMI, no stare, lid lag, or retraction; no conjunctival injection.  ENMT: Lips are without lesions.   Neck: No visible goiter or neck mass.  Respiratory: No audible wheeze or cough. No visible cyanosis. No visible increased work of breathing.  Neurological: Alert and oriented times 3.  Cranial nerves grossly intact.      DATA REVIEW  Each of the following laboratory and/or imaging studies were reviewed.    DXA as in HPI.          ASSESSMENT  1.  Osteoporosis.  Without fracture history.  Potential contributors to bone health include early menopause and perhaps excess thyroid hormone supplementation in the past; would also screen for other secondary causes of osteoporosis given degree of diminution in T-scores--including screening for hypercalciuria given history of nephrolithiasis.  Testing by her previous endocrinologist revealed normal serum protein electrophoresis, PTH and vitamin D levels.    ~Would add low-dose of calcium citrate to augment dietary intake.  We can decide whether vitamin D supplementation is needed based on our test results.  ~Would also refer for DXA scan to reevaluate BMD.  If acceptable response to Reclast treatment, consider administering third dose in 2/2025.  If less desirable response or marked diminution in BMD, can consider alternate therapies (in which case we can arrange for closer follow-up to discuss options).  We " reviewed benefits and potential side effects of IV Reclast including infusion reaction, arthralgias and rare side effects such as ONJ and AFF.    2.  History of nephrolithiasis.  24 urine studies as above.  Agree with referral to nephrology to evaluate for all stone formation risk factors.    3.  Hypothyroidism.  Balance of T4-T3 regimen is more heavily skewed toward liothyronine; would recommend more physiologic replacement of thyroid hormone and daily dosing of liothyronine given its shorter half-life.  We will reevaluate thyroid function tests and give recommendations on thyroid hormone regimen thereafter: The patient is open to adjusting liothyronine.    PLAN  -Labs in the coming few weeks    -Start calcium citrate 500 mg once daily; be mindful of serving size of calcium supplement and how many tablets are needed to get labelled dose  -2 weeks (or longer) after making calcium supplement changes, please perform 24 hour urine studies  -Schedule DXA scan  -Once test results are complete, we will determine whether to move forward with third dose of IV Reclast in February 2025 or if we should consider a change in treatment regimen  -Return for a follow-up visit in 1 year; we will arrange for closer follow-up if needed  -We will communicate results via [employer], or if needed by phone       Orders Placed This Encounter   Procedures     DX Bone Density     TSH     T4 free     T3 total     Vitamin D Deficiency     Comprehensive metabolic panel     Phosphorus     Parathyroid Hormone Intact     IgA     Protein Immunofixation Serum     Tissue transglutaminase bjorn IgA and IgG     Calcium timed urine     Sodium timed urine     Creatinine timed urine     Cortisol free urine         Video-Visit Details    Type of service:  Video Visit    Physician location: Off-site    Video Start Time: 10:34 AM  Video End Time: 11:15 AM    I spent a total of 69 minutes on the date of encounter reviewing medical records, evaluating the patient,  coordinating care and documenting in the EHR, as detailed above.      Platform used for Video Visit: Darlyn Degroot MD   Division of Diabetes, Endocrinology and Metabolism  Department of Medicine      cc: LINDA Yates        Again, thank you for allowing me to participate in the care of your patient.      Sincerely,    Madelin Degroot MD

## 2024-12-16 NOTE — PATIENT INSTRUCTIONS
-Labs in the coming few weeks    -Start calcium citrate 500 mg once daily; be mindful of serving size of calcium supplement and how many tablets are needed to get labelled dose  -2 weeks (or longer) after making calcium supplement changes, please perform 24 hour urine studies  -Schedule DXA scan  -Once test results are complete, we will determine whether to move forward with third dose of IV Reclast in 2025 or if we should consider a change in treatment regimen  -Return for a follow-up visit in 1 year; we will arrange for closer follow-up if needed  -We will communicate results via [employer], or if needed by phone     Lab appointments at the [facility] can be scheduled with the main endocrinology clinic scheduling line ([phone]).      Imaging appointments are scheduled with the imaging centers. Below are the scheduling options for the [facility] Imaging Centers:   **Please note: Some imaging appointments are limited to the [place] location.   **Please note: DXA Bone Density Scans should always be scheduled at the same location they've previously been performed at (for continuity purposes.)     -[facility] ([facility], [facility], [place]): [phone]   -[facility] ([place], [place], [place], [place], etc.): [phone]   -[facility] ([place], [place], [place], etc.): [phone]   -[facility] ([place], [place], etc.): [phone]   -[place]: [phone]   -[place]: [phone]

## 2024-12-16 NOTE — PROGRESS NOTES
ENDOCRINOLOGY VIDEO VISIT NEW        HISTORY OF PRESENT ILLNESS    Nora Vargas is a 59 year old female who is being evaluated via a billable video visit. The patient is seen in consultation at the request of LINDA Rader for hypothyroidism and osteoporosis.    I reviewed outside records in care everywhere.  The patient was previously followed in diabetes and endocrinology at [facility] ([facility]).    1.  Hypothyroidism.  Patient recalls that her primary care physician in [place] diagnosed hypothyroidism in 2018--she notes that antithyroid antibodies were positive and she was diagnosed with Hashimoto's thyroiditis.  She started liothyronine about 1 year after diagnosis.    According to outside endocrinology records, has been treated with levothyroxine 50 mcg daily and liothyronine 7.5 mcg daily (both taken 6 days a week)--patient confirms this regimen and notes that she gets liothyronine from compounding pharmacy at 7.5 mcg dose.    Has chronic fatigue syndrome and has not noted a significant change in her symptoms with the addition of liothyronine.  She has chronic constipation which she manages with high-fiber diet and consistent hydration.    2.  Osteoporosis.  According to outside records in care everywhere, diagnosed with osteoporosis in 2/2020 (lumbar spine T-score -3.5, left femoral neck T-score -2.4).    Testing at the time of diagnosis (reviewed in Care Everywhere) revealed normal serum protein electrophoresis, PTH, phosphorus, vitamin D and suppressed TSH.    Fracture history: None, no height loss in adulthood.    Treatment history:  -Alendronate prescribed 8/2020.  -Transitioned to Reclast in 2/2023.  From what I can gather from records in Care Everywhere, the patient received Reclast infusion on 2/10/2023 and 2/14/2024.  She has tolerated Reclast without side effect.    Most recent DXA scan on 12/2/2022 at [facility] (images are not available) showed the following:  -L1-L4 T-score  -3.4  -Left femoral neck T-score -2.6, left total hip T-score -2.1  -Right femoral neck T-score -2.4, right total hip T-score -1.8    Calcium/vitamin D intake:  - Diet - Cheese daily, milk daily with breakfast (8 ounces at least). Has kale daily.  - Supplements - None  - On famotidine  - Vitamin D3 3000 IU daily    No history of hypercalcemia.  The patient has history of nephrolithiasis since 2018, recurrent.  She has required lithotripsy.  Stone analysis performed on 10/4/2024 revealed calcium oxalate stone; Stone analysis from 9/18/2024 also revealed calcium oxalate stone. Has had urine studies through urology; was most recently referred to nephrology by her urologist for workup of metabolic causes of stone formation.    No known history of malabsorptive disorder; no history of GI surgery; no chronic diarrhea.    Has used inhaled steroids in the past for 3-4 years; no history of long-term systemic glucocorticoid therapy.  Approximate age at menopause: Around age 49.  No HRT.    Never tobacco use.  Rare alcohol use.  Limited physical activity due to chronic fatigue syndrome.     Mother and older sister with osteoporosis.  No parental hip fracture history.    Has regular dentist appointments. Not anticipating invasive dental surgery.    No history of radiation therapy involving bone. No personal or family history of osteosarcoma.    No history of coronary artery disease, myocardial infarction or CVA.    History of muscle weakness, patient notes she was screened for myasthenia gravis with negative testing. Has mast cell activation syndrome.    Pertinent Social History: , with children.  Moved back to [place] from [place] in June 2024. On SSDI, previously worked as a writer.     PAST MEDICAL HISTORY  Past Medical History:   Diagnosis Date    Chronic fatigue syndrome     Hashimoto's thyroiditis     Mast cell activation syndrome (H)     Osteoporosis        MEDICATIONS  Current Outpatient Medications   Medication Sig  Dispense Refill    albuterol (PROAIR HFA/PROVENTIL HFA/VENTOLIN HFA) 108 (90 Base) MCG/ACT inhaler Inhale 1-2 puffs into the lungs every 4 hours as needed      budesonide-formoterol (BREYNA) 160-4.5 MCG/ACT Inhaler Inhale 2 puffs into the lungs two times daily      cetirizine (ZYRTEC) 10 MG tablet Take 10 mg by mouth daily      cholecalciferol 125 MCG (5000 UT) CAPS       Cobalamin Combinations (B12 FOLATE) 800-800 MCG CAPS       docusate sodium (COLACE) 100 MG tablet Take 1 tablet (100 mg) by mouth daily. 60 tablet 1    famotidine (PEPCID) 20 MG tablet Take 1 tablet (20 mg) by mouth 2 times daily      fexofenadine (ALLEGRA ALLERGY) 180 MG tablet Take 1 tablet by mouth daily      levothyroxine (SYNTHROID/LEVOTHROID) 50 MCG tablet Take 1 tablet daily, skip Sundays 90 tablet 3    LIOTHYRONINE SODIUM PO Take 7.5 mcg by mouth daily Skip Sundays--gets from compounding pharmacy      [facility] Natural Products (ATRANTIL) CAPS       Multiple Vitamin (MULTIVITAMIN ADULT PO) Take 1 tablet by mouth daily      naltrexone 1.5 MG capsule Take 9 mg by mouth at bedtime      oxyBUTYnin ER (DITROPAN XL) 5 MG 24 hr tablet Take 1 tablet (5 mg) by mouth daily. Take daily until your stent is removed. 30 tablet 1    potassium citrate (UROCIT-K) 10 MEQ (1080 MG) CR tablet Take 2 tablets (20 mEq) by mouth 2 times daily. 360 tablet 1    Quercetin 500 MG CAPS 800 mg by oral route.      tamsulosin (FLOMAX) 0.4 MG capsule Take 1 capsule (0.4 mg) by mouth daily. 30 capsule 0    traZODone (DESYREL) 150 MG tablet Take 1 tablet (150 mg) by mouth at bedtime 90 tablet 1    WELCHOL 625 MG tablet Take 625 mg by mouth every 24 hours      zoledronic acid (RECLAST) 5 MG/100ML SOLN infusion Inject 100 mLs (5 mg) into the vein         Allergies, family, and social history were reviewed and documented as needed in EHR.     REVIEW OF SYSTEMS  A focused ROS was performed, with pertinent positives and negatives as noted in the HPI.    PHYSICAL EXAM  Ht 1.499 m (4'  "11\")   Wt 49 kg (108 lb)   LMP  (LMP Unknown)   BMI 21.81 kg/m    Body mass index is 21.81 kg/m .  Constitutional: Patient is alert, oriented and appears in no acute distress.  Eyes: Eyes grossly normal to inspection, EOMI, no stare, lid lag, or retraction; no conjunctival injection.  ENMT: Lips are without lesions.   Neck: No visible goiter or neck mass.  Respiratory: No audible wheeze or cough. No visible cyanosis. No visible increased work of breathing.  Neurological: Alert and oriented times 3.  Cranial nerves grossly intact.      DATA REVIEW  Each of the following laboratory and/or imaging studies were reviewed.    DXA as in HPI.          ASSESSMENT  1.  Osteoporosis.  Without fracture history.  Potential contributors to bone health include early menopause and perhaps excess thyroid hormone supplementation in the past; would also screen for other secondary causes of osteoporosis given degree of diminution in T-scores--including screening for hypercalciuria given history of nephrolithiasis.  Testing by her previous endocrinologist revealed normal serum protein electrophoresis, PTH and vitamin D levels.    ~Would add low-dose of calcium citrate to augment dietary intake.  We can decide whether vitamin D supplementation is needed based on our test results.  ~Would also refer for DXA scan to reevaluate BMD.  If acceptable response to Reclast treatment, consider administering third dose in 2/2025.  If less desirable response or marked diminution in BMD, can consider alternate therapies (in which case we can arrange for closer follow-up to discuss options).  We reviewed benefits and potential side effects of IV Reclast including infusion reaction, arthralgias and rare side effects such as ONJ and AFF.    2.  History of nephrolithiasis.  24 urine studies as above.  Agree with referral to nephrology to evaluate for all stone formation risk factors.    3.  Hypothyroidism.  Balance of T4-T3 regimen is more heavily " skewed toward liothyronine; would recommend more physiologic replacement of thyroid hormone and daily dosing of liothyronine given its shorter half-life.  We will reevaluate thyroid function tests and give recommendations on thyroid hormone regimen thereafter: The patient is open to adjusting liothyronine.    PLAN  -Labs in the coming few weeks    -Start calcium citrate 500 mg once daily; be mindful of serving size of calcium supplement and how many tablets are needed to get labelled dose  -2 weeks (or longer) after making calcium supplement changes, please perform 24 hour urine studies  -Schedule DXA scan  -Once test results are complete, we will determine whether to move forward with third dose of IV Reclast in February 2025 or if we should consider a change in treatment regimen  -Return for a follow-up visit in 1 year; we will arrange for closer follow-up if needed  -We will communicate results via [employer], or if needed by phone       Orders Placed This Encounter   Procedures    DX Bone Density    TSH    T4 free    T3 total    Vitamin D Deficiency    Comprehensive metabolic panel    Phosphorus    Parathyroid Hormone Intact    IgA    Protein Immunofixation Serum    Tissue transglutaminase bjorn IgA and IgG    Calcium timed urine    Sodium timed urine    Creatinine timed urine    Cortisol free urine         Video-Visit Details    Type of service:  Video Visit    Physician location: Off-site    Video Start Time: 10:34 AM  Video End Time: 11:15 AM    I spent a total of 69 minutes on the date of encounter reviewing medical records, evaluating the patient, coordinating care and documenting in the EHR, as detailed above.      Platform used for Video Visit: Darlyn Degroot MD   Division of Diabetes, Endocrinology and Metabolism  Department of Medicine      cc: LINDA Yates

## 2024-12-16 NOTE — NURSING NOTE
Current patient location: [facility] [address]  [place] [address]    Is the patient currently in the state of [place]? YES    Visit mode:VIDEO    If the visit is dropped, the patient can be reconnected by:VIDEO VISIT: Send to e-mail at: [email or web address]    Will anyone else be joining the visit? NO  (If patient encounters technical issues they should call [phone])    Are changes needed to the allergy or medication list? No    Are refills needed on medications prescribed by this physician? NO    Rooming Documentation:  Questionnaire(s) completed    Reason for visit: Consult    Jacqui TAMF

## 2025-01-29 ENCOUNTER — MYC MEDICAL ADVICE (OUTPATIENT)
Dept: ENDOCRINOLOGY | Facility: CLINIC | Age: 60
End: 2025-01-29
Payer: COMMERCIAL

## 2025-05-22 ENCOUNTER — INFUSION THERAPY VISIT (OUTPATIENT)
Dept: INFUSION THERAPY | Facility: CLINIC | Age: 60
End: 2025-05-22
Attending: INTERNAL MEDICINE
Payer: MEDICARE

## 2025-05-22 VITALS
SYSTOLIC BLOOD PRESSURE: 109 MMHG | OXYGEN SATURATION: 96 % | HEART RATE: 80 BPM | TEMPERATURE: 97.1 F | RESPIRATION RATE: 16 BRPM | DIASTOLIC BLOOD PRESSURE: 72 MMHG

## 2025-05-22 DIAGNOSIS — M81.8 OTHER OSTEOPOROSIS WITHOUT CURRENT PATHOLOGICAL FRACTURE: Primary | ICD-10-CM

## 2025-05-22 LAB
ALBUMIN SERPL BCG-MCNC: 4.3 G/DL (ref 3.5–5.2)
CALCIUM SERPL-MCNC: 9.2 MG/DL (ref 8.8–10.4)
CREAT SERPL-MCNC: 0.73 MG/DL (ref 0.51–0.95)
EGFRCR SERPLBLD CKD-EPI 2021: >90 ML/MIN/1.73M2

## 2025-05-22 PROCEDURE — 82040 ASSAY OF SERUM ALBUMIN: CPT | Performed by: INTERNAL MEDICINE

## 2025-05-22 PROCEDURE — 82310 ASSAY OF CALCIUM: CPT | Performed by: INTERNAL MEDICINE

## 2025-05-22 PROCEDURE — 82565 ASSAY OF CREATININE: CPT | Performed by: INTERNAL MEDICINE

## 2025-05-22 RX ORDER — HEPARIN SODIUM (PORCINE) LOCK FLUSH IV SOLN 100 UNIT/ML 100 UNIT/ML
5 SOLUTION INTRAVENOUS
OUTPATIENT
Start: 2026-05-22

## 2025-05-22 RX ORDER — BUDESONIDE AND FORMOTEROL FUMARATE DIHYDRATE 80; 4.5 UG/1; UG/1
2 AEROSOL RESPIRATORY (INHALATION) 2 TIMES DAILY
COMMUNITY

## 2025-05-22 RX ORDER — ZOLEDRONIC ACID 0.05 MG/ML
5 INJECTION, SOLUTION INTRAVENOUS ONCE
Start: 2026-05-22

## 2025-05-22 RX ORDER — HEPARIN SODIUM,PORCINE 10 UNIT/ML
5-20 VIAL (ML) INTRAVENOUS DAILY PRN
OUTPATIENT
Start: 2026-05-22

## 2025-05-22 RX ORDER — ZOLEDRONIC ACID 0.05 MG/ML
5 INJECTION, SOLUTION INTRAVENOUS ONCE
Status: COMPLETED | OUTPATIENT
Start: 2025-05-22 | End: 2025-05-22

## 2025-05-22 RX ORDER — DIPHENHYDRAMINE HYDROCHLORIDE 50 MG/ML
25 INJECTION, SOLUTION INTRAMUSCULAR; INTRAVENOUS
Start: 2026-05-22

## 2025-05-22 RX ORDER — ALBUTEROL SULFATE 90 UG/1
1-2 INHALANT RESPIRATORY (INHALATION)
Start: 2026-05-22

## 2025-05-22 RX ORDER — DIPHENHYDRAMINE HYDROCHLORIDE 50 MG/ML
50 INJECTION, SOLUTION INTRAMUSCULAR; INTRAVENOUS
Start: 2026-05-22

## 2025-05-22 RX ORDER — METHYLPREDNISOLONE SODIUM SUCCINATE 40 MG/ML
40 INJECTION INTRAMUSCULAR; INTRAVENOUS
Start: 2026-05-22

## 2025-05-22 RX ORDER — EPINEPHRINE 1 MG/ML
0.3 INJECTION, SOLUTION INTRAMUSCULAR; SUBCUTANEOUS EVERY 5 MIN PRN
OUTPATIENT
Start: 2026-05-22

## 2025-05-22 RX ORDER — ALBUTEROL SULFATE 0.83 MG/ML
2.5 SOLUTION RESPIRATORY (INHALATION)
OUTPATIENT
Start: 2026-05-22

## 2025-05-22 RX ADMIN — ZOLEDRONIC ACID 5 MG: 0.05 INJECTION, SOLUTION INTRAVENOUS at 09:23

## 2025-05-22 RX ADMIN — Medication 100 ML: at 09:22

## 2025-05-22 NOTE — PROGRESS NOTES
Infusion Nursing Note:  Nora Vargas presents today for Labs, reclast.    Patient seen by provider today: No   present during visit today: Not Applicable.    Note:   - Pt is taking vitamin D supplements. She states that she does not tolerate Calcium supplements well. Denies any recent/upcoming invasive dental procedures. Denies any jawbone pain.   - Pt has had 2 Reclast infusions in [place] and tolerated with no issues.   - Uses a wheelchair for her chronic fatigue syndrome.    Intravenous Access:  Labs drawn without difficulty.  Peripheral IV placed.    Treatment Conditions:  Results reviewed, labs MET treatment parameters, ok to proceed with treatment.   05/22/25 08:51   Creatinine 0.73   GFR Estimate >90   Calcium 9.2   Albumin 4.3       Post Infusion Assessment:  Patient tolerated infusion without incident.  Blood return noted pre and post infusion.  Site patent and intact, free from redness, edema or discomfort.  No evidence of extravasations.  Access discontinued per protocol.       Discharge Plan:   Discharge instructions reviewed with: Patient and Family.  Patient and/or family verbalized understanding of discharge instructions and all questions answered.  AVS to patient via [employer].  Patient will return in one year for next appointment.   Patient discharged in stable condition accompanied by: self and .  Departure Mode: Wheelchair.      Magali Mendoza RN

## 2025-05-29 ENCOUNTER — RESULTS FOLLOW-UP (OUTPATIENT)
Dept: ENDOCRINOLOGY | Facility: CLINIC | Age: 60
End: 2025-05-29

## 2025-06-12 DIAGNOSIS — R10.9 BILATERAL FLANK PAIN: Primary | ICD-10-CM

## 2025-06-17 ENCOUNTER — VIRTUAL VISIT (OUTPATIENT)
Dept: UROLOGY | Facility: CLINIC | Age: 60
End: 2025-06-17
Payer: MEDICARE

## 2025-06-17 ENCOUNTER — HOSPITAL ENCOUNTER (OUTPATIENT)
Dept: CT IMAGING | Facility: CLINIC | Age: 60
Discharge: HOME | End: 2025-06-17
Attending: UROLOGY
Payer: MEDICARE

## 2025-06-17 DIAGNOSIS — R10.9 LEFT FLANK PAIN: ICD-10-CM

## 2025-06-17 DIAGNOSIS — N20.0 RIGHT KIDNEY STONE: ICD-10-CM

## 2025-06-17 DIAGNOSIS — R10.9 BILATERAL FLANK PAIN: ICD-10-CM

## 2025-06-17 DIAGNOSIS — N20.0 CALCULUS OF LEFT KIDNEY: Primary | ICD-10-CM

## 2025-06-17 PROCEDURE — 1125F AMNT PAIN NOTED PAIN PRSNT: CPT | Mod: 95 | Performed by: UROLOGY

## 2025-06-17 PROCEDURE — 74176 CT ABD & PELVIS W/O CONTRAST: CPT

## 2025-06-17 PROCEDURE — 98006 SYNCH AUDIO-VIDEO EST MOD 30: CPT | Performed by: UROLOGY

## 2025-06-17 NOTE — PROGRESS NOTES
[facility] [place]   CHIEF COMPLAINT   It was my pleasure to see Nora Vargas who is a 59 year old female for follow-up of Bilateral kidney stones.      HPI   Nora Vargas is a very pleasant 59 year old female     8/14/2024 - Jhonny Iten:  Nora Vargas is a 58 year old female with history of nephrolithiasis, previously underwent ESWL & URS with Dr. Moise of [facility]. Over the last several days prior to presenting to clinic the patient developed intermittent pain in the flank and side of both sides of body. Afebrile. No blood on UA today. Nora tries to stay hydrated and tries to avoid high oxalate foods. CT was ordered which revealed at least 6 stones on each side, with the largest on the L measuring 8 mm & the largest on the R measuring 5 mm. CT showed no obstructing or ureteral stones.     9/18/2024:  RIGHT Ureteroscopy and b/l ureteral stent placement    10/4/2024:  LEFT Ureteroscopy with laser lithotripsy and CVAC    TODAY 6/17/2025:  Started to have some intermittent bilateral flank pain over the last 6 weeks  At times bilateral and other times pain will alternate  No clear aggravating or relieving factors    PHYSICAL EXAM  Patient is a 59 year old  female   Vitals: not currently breastfeeding.  There is no height or weight on file to calculate BMI.  General Appearance Adult:   Alert, no acute distress, oriented  Neuro: Alert, oriented, speech and mentation normal  Psych: affect and mood normal       Creatinine   Date Value Ref Range Status   05/22/2025 0.73 0.51 - 0.95 mg/dL Final      IMAGING:  All pertinent imaging reviewed:    All imaging studies reviewed by me.  I personally reviewed these imaging films.  A formal report from radiology will follow.    CT ABD/PEL 6/17/2025:  FINDINGS:   LOWER CHEST: Bibasilar pulmonary pulmonary opacities, likely atelectasis.     ABDOMEN/PELVIS: Limited evaluation of the abdominal organs due to lack of intravenous contrast.     HEPATOBILIARY: The unenhanced  liver and gallbladder are grossly unremarkable.     PANCREAS: The unenhanced pancreas is grossly unremarkable.     SPLEEN: No splenomegaly.     ADRENAL GLANDS: No adrenal nodules.     KIDNEYS/BLADDER: Multiple bilateral kidney stones, right more than left, including 3 mm stone at the upper pole of the right kidney and 3 mm stone at the interpolar region of the left kidney. No ureteral stone or hydronephrosis in either kidney. There is   2.4 cm renal cyst of the right kidney, could be related to prior infectious insult at the interpolar region of the right kidney.     Renal cortical scarring. The urinary bladder is distended, otherwise unremarkable.     BOWEL: No abnormally dilated bowel loops. The appendix is visualized and appears normal. Colonic diverticulosis without CT evidence of acute diverticulitis. Moderate amount of stool in the colon.     PERITONEUM: No evidence of free fluid in the abdomen and pelvis. No free peritoneal or portal venous gas.     PELVIC ORGANS: Unremarkable.     VASCULATURE: Unremarkable.     LYMPH NODES: No significant abdominopelvic lymphadenopathy.     MUSCULOSKELETAL: No suspicious osseous lesion. Small fat-containing umbilical/periumbilical hernia.                                                                      IMPRESSION:  1.  Multiple bilateral kidney stones, right slightly more than left, measure up to 3 mm at the upper pole of the right kidney and 3 mm at the interpolar region of the left kidney. No ureteral stone or hydronephrosis in either kidney.  2.  Colonic diverticulosis without CT evidence of acute diverticulitis.    ASSESSMENT and PLAN  59-year-old female with history of bilateral nephrolithiasis now with intermittent flank pain which is worse on the left    Left-sided flank pain  - I reviewed her labs which are notable for a normal and stable serum creatinine  - I reviewed her CT scan and reviewed these images personally.  I agree with radiologist interpretation.   There are 2 stones which seem to be potentially obstructing a infundibulum in the left kidney which could be the source of her intermittent flank pain  - We discussed treatment options with the observation versus consideration for ureteroscopy and she would like to proceed with ureteroscopy.  We again discussed the risks benefits of the surgery and orders placed for surgery at [facility]  - This is a chronic problem with recurrent symptoms requiring surgical intervention    Left Ureteroscopy at [facility]    Time spent: 15 minutes spent on the date of the encounter doing chart review, history and exam, documentation and further activities as noted above.    Michael Erazo MD   Urology  [facility]  [facility] Phone: [phone]  [facility] Phone: [phone]        Virtual Visit Details    Type of service:  Video Visit   Video Start Time: 3:40 PM  Video End Time:3:50 PM    Originating Location (pt. Location): Home    Distant Location (provider location):  On-site  Platform used for Video Visit: Darlyn

## 2025-06-17 NOTE — NURSING NOTE
Current patient location: [address] [place] [address] [address]  [place] [address]    Is the patient currently in the state of [place]? YES    Visit mode: VIDEO    If the visit is dropped, the patient can be reconnected by:VIDEO VISIT: Text to cell phone:   Telephone Information:   Mobile [phone]       Will anyone else be joining the visit? NO  (If patient encounters technical issues they should call [phone]    Are changes needed to the allergy or medication list? No and Pt stated no med changes    Are refills needed on medications prescribed by this physician? NO    Rooming Documentation:  Not applicable    Reason for visit: PREETI EDUARDO

## 2025-06-23 ENCOUNTER — MYC MEDICAL ADVICE (OUTPATIENT)
Dept: UROLOGY | Facility: CLINIC | Age: 60
End: 2025-06-23
Payer: MEDICARE

## 2025-06-23 NOTE — CONFIDENTIAL NOTE
Per chart review, patient has been in contact with surgery scheduler and is scheduled for surgery with Dr. Erazo on 7/11/25 at [facility].    Rocio Bender RN, BSN

## 2025-07-11 ENCOUNTER — HOSPITAL ENCOUNTER (OUTPATIENT)
Facility: CLINIC | Age: 60
Discharge: HOME | End: 2025-07-11
Attending: UROLOGY | Admitting: UROLOGY
Payer: MEDICARE

## 2025-07-11 ENCOUNTER — APPOINTMENT (OUTPATIENT)
Dept: GENERAL RADIOLOGY | Facility: CLINIC | Age: 60
End: 2025-07-11
Attending: UROLOGY
Payer: MEDICARE

## 2025-07-11 VITALS
DIASTOLIC BLOOD PRESSURE: 88 MMHG | WEIGHT: 108.8 LBS | HEIGHT: 59 IN | RESPIRATION RATE: 16 BRPM | TEMPERATURE: 97.4 F | BODY MASS INDEX: 21.93 KG/M2 | HEART RATE: 62 BPM | OXYGEN SATURATION: 98 % | SYSTOLIC BLOOD PRESSURE: 118 MMHG

## 2025-07-11 DIAGNOSIS — N20.0 KIDNEY STONE ON LEFT SIDE: ICD-10-CM

## 2025-07-11 DIAGNOSIS — N20.1 RIGHT URETERAL STONE: ICD-10-CM

## 2025-07-11 DIAGNOSIS — N20.0 CALCULUS OF LEFT KIDNEY: Primary | ICD-10-CM

## 2025-07-11 PROCEDURE — 999N000141 HC STATISTIC PRE-PROCEDURE NURSING ASSESSMENT: Performed by: UROLOGY

## 2025-07-11 PROCEDURE — 370N000017 HC ANESTHESIA TECHNICAL FEE, PER MIN: Performed by: UROLOGY

## 2025-07-11 PROCEDURE — C2617 STENT, NON-COR, TEM W/O DEL: HCPCS | Performed by: UROLOGY

## 2025-07-11 PROCEDURE — 272N000001 HC OR GENERAL SUPPLY STERILE: Performed by: UROLOGY

## 2025-07-11 PROCEDURE — C1894 INTRO/SHEATH, NON-LASER: HCPCS | Performed by: UROLOGY

## 2025-07-11 PROCEDURE — 74420 UROGRAPHY RTRGR +-KUB: CPT | Mod: 26 | Performed by: UROLOGY

## 2025-07-11 PROCEDURE — 250N000011 HC RX IP 250 OP 636: Performed by: UROLOGY

## 2025-07-11 PROCEDURE — C1769 GUIDE WIRE: HCPCS | Performed by: UROLOGY

## 2025-07-11 PROCEDURE — 999N000179 XR SURGERY CARM FLUORO LESS THAN 5 MIN W STILLS

## 2025-07-11 PROCEDURE — 710N000012 HC RECOVERY PHASE 2, PER MINUTE: Performed by: UROLOGY

## 2025-07-11 PROCEDURE — 258N000001 HC RX 258: Performed by: UROLOGY

## 2025-07-11 PROCEDURE — 82365 CALCULUS SPECTROSCOPY: CPT | Performed by: UROLOGY

## 2025-07-11 PROCEDURE — 250N000009 HC RX 250: Performed by: UROLOGY

## 2025-07-11 PROCEDURE — C1758 CATHETER, URETERAL: HCPCS | Performed by: UROLOGY

## 2025-07-11 PROCEDURE — 710N000009 HC RECOVERY PHASE 1, LEVEL 1, PER MIN: Performed by: UROLOGY

## 2025-07-11 PROCEDURE — 250N000013 HC RX MED GY IP 250 OP 250 PS 637: Performed by: UROLOGY

## 2025-07-11 PROCEDURE — 250N000025 HC SEVOFLURANE, PER MIN: Performed by: UROLOGY

## 2025-07-11 PROCEDURE — 360N000077 HC SURGERY LEVEL 4, PER MIN: Performed by: UROLOGY

## 2025-07-11 PROCEDURE — 52356 CYSTO/URETERO W/LITHOTRIPSY: CPT | Mod: LT | Performed by: UROLOGY

## 2025-07-11 DEVICE — URETERAL STENT
Type: IMPLANTABLE DEVICE | Site: URETER | Status: FUNCTIONAL
Brand: POLARIS™ ULTRA

## 2025-07-11 RX ORDER — ACETAMINOPHEN 325 MG/1
975 TABLET ORAL ONCE
Status: COMPLETED | OUTPATIENT
Start: 2025-07-11 | End: 2025-07-11

## 2025-07-11 RX ORDER — ONDANSETRON 2 MG/ML
4 INJECTION INTRAMUSCULAR; INTRAVENOUS EVERY 30 MIN PRN
Status: DISCONTINUED | OUTPATIENT
Start: 2025-07-11 | End: 2025-07-11 | Stop reason: HOSPADM

## 2025-07-11 RX ORDER — HYDROMORPHONE HCL IN WATER/PF 6 MG/30 ML
0.4 PATIENT CONTROLLED ANALGESIA SYRINGE INTRAVENOUS EVERY 5 MIN PRN
Status: DISCONTINUED | OUTPATIENT
Start: 2025-07-11 | End: 2025-07-11 | Stop reason: HOSPADM

## 2025-07-11 RX ORDER — NALOXONE HYDROCHLORIDE 0.4 MG/ML
0.1 INJECTION, SOLUTION INTRAMUSCULAR; INTRAVENOUS; SUBCUTANEOUS
Status: DISCONTINUED | OUTPATIENT
Start: 2025-07-11 | End: 2025-07-11 | Stop reason: HOSPADM

## 2025-07-11 RX ORDER — OXYBUTYNIN CHLORIDE 5 MG/1
5 TABLET, EXTENDED RELEASE ORAL DAILY
Qty: 10 TABLET | Refills: 0 | Status: SHIPPED | OUTPATIENT
Start: 2025-07-11 | End: 2025-07-21

## 2025-07-11 RX ORDER — CEFAZOLIN SODIUM/WATER 2 G/20 ML
2 SYRINGE (ML) INTRAVENOUS
Status: COMPLETED | OUTPATIENT
Start: 2025-07-11 | End: 2025-07-11

## 2025-07-11 RX ORDER — IOPAMIDOL 612 MG/ML
INJECTION, SOLUTION INTRATHECAL PRN
Status: DISCONTINUED | OUTPATIENT
Start: 2025-07-11 | End: 2025-07-11 | Stop reason: HOSPADM

## 2025-07-11 RX ORDER — OXYCODONE HYDROCHLORIDE 5 MG/1
5 TABLET ORAL EVERY 6 HOURS PRN
Qty: 9 TABLET | Refills: 0 | Status: SHIPPED | OUTPATIENT
Start: 2025-07-11 | End: 2025-07-14

## 2025-07-11 RX ORDER — CEFAZOLIN SODIUM/WATER 2 G/20 ML
2 SYRINGE (ML) INTRAVENOUS SEE ADMIN INSTRUCTIONS
Status: DISCONTINUED | OUTPATIENT
Start: 2025-07-11 | End: 2025-07-11 | Stop reason: HOSPADM

## 2025-07-11 RX ORDER — ONDANSETRON 4 MG/1
4 TABLET, ORALLY DISINTEGRATING ORAL EVERY 30 MIN PRN
Status: DISCONTINUED | OUTPATIENT
Start: 2025-07-11 | End: 2025-07-11 | Stop reason: HOSPADM

## 2025-07-11 RX ORDER — SODIUM CHLORIDE, SODIUM LACTATE, POTASSIUM CHLORIDE, CALCIUM CHLORIDE 600; 310; 30; 20 MG/100ML; MG/100ML; MG/100ML; MG/100ML
INJECTION, SOLUTION INTRAVENOUS CONTINUOUS
Status: DISCONTINUED | OUTPATIENT
Start: 2025-07-11 | End: 2025-07-11 | Stop reason: HOSPADM

## 2025-07-11 RX ORDER — HYDROMORPHONE HCL IN WATER/PF 6 MG/30 ML
0.2 PATIENT CONTROLLED ANALGESIA SYRINGE INTRAVENOUS EVERY 5 MIN PRN
Status: DISCONTINUED | OUTPATIENT
Start: 2025-07-11 | End: 2025-07-11 | Stop reason: HOSPADM

## 2025-07-11 RX ORDER — DEXAMETHASONE SODIUM PHOSPHATE 4 MG/ML
4 INJECTION, SOLUTION INTRA-ARTICULAR; INTRALESIONAL; INTRAMUSCULAR; INTRAVENOUS; SOFT TISSUE
Status: DISCONTINUED | OUTPATIENT
Start: 2025-07-11 | End: 2025-07-11 | Stop reason: HOSPADM

## 2025-07-11 RX ORDER — MAGNESIUM HYDROXIDE 1200 MG/15ML
LIQUID ORAL PRN
Status: DISCONTINUED | OUTPATIENT
Start: 2025-07-11 | End: 2025-07-11 | Stop reason: HOSPADM

## 2025-07-11 RX ORDER — ASPIRIN 81 MG
100 TABLET, DELAYED RELEASE (ENTERIC COATED) ORAL DAILY
Qty: 60 TABLET | Refills: 1 | Status: SHIPPED | OUTPATIENT
Start: 2025-07-11

## 2025-07-11 RX ORDER — LIDOCAINE 40 MG/G
CREAM TOPICAL
Status: DISCONTINUED | OUTPATIENT
Start: 2025-07-11 | End: 2025-07-11 | Stop reason: HOSPADM

## 2025-07-11 RX ORDER — ACETAMINOPHEN 650 MG/1
650 SUPPOSITORY RECTAL ONCE
Status: COMPLETED | OUTPATIENT
Start: 2025-07-11 | End: 2025-07-11

## 2025-07-11 RX ORDER — TAMSULOSIN HYDROCHLORIDE 0.4 MG/1
0.4 CAPSULE ORAL DAILY
Qty: 10 CAPSULE | Refills: 0 | Status: SHIPPED | OUTPATIENT
Start: 2025-07-11

## 2025-07-11 RX ORDER — FENTANYL CITRATE 50 UG/ML
25 INJECTION, SOLUTION INTRAMUSCULAR; INTRAVENOUS EVERY 5 MIN PRN
Status: DISCONTINUED | OUTPATIENT
Start: 2025-07-11 | End: 2025-07-11 | Stop reason: HOSPADM

## 2025-07-11 RX ORDER — FENTANYL CITRATE 50 UG/ML
50 INJECTION, SOLUTION INTRAMUSCULAR; INTRAVENOUS EVERY 5 MIN PRN
Status: DISCONTINUED | OUTPATIENT
Start: 2025-07-11 | End: 2025-07-11 | Stop reason: HOSPADM

## 2025-07-11 RX ADMIN — ACETAMINOPHEN 975 MG: 325 TABLET ORAL at 14:48

## 2025-07-11 ASSESSMENT — ACTIVITIES OF DAILY LIVING (ADL)
ADLS_ACUITY_SCORE: 44
ADLS_ACUITY_SCORE: 41
ADLS_ACUITY_SCORE: 44

## 2025-07-11 NOTE — DISCHARGE INSTRUCTIONS
Same Day Surgery Discharge Instructions for  Sedation and General Anesthesia     It's not unusual to feel dizzy, light-headed or faint for up to 24 hours after surgery or while taking pain medication.  If you have these symptoms: sit for a few minutes before standing and have someone assist you when you get up to walk or use the bathroom.    You should rest and relax for the next 24 hours. We recommend you make arrangements to have an adult stay with you for at least 24 hours after your discharge.  Avoid hazardous and strenuous activity.    DO NOT DRIVE any vehicle or operate mechanical equipment for 24 hours following the end of your surgery.  Even though you may feel normal, your reactions may be affected by the medication you have received.    Do not drink alcoholic beverages for 24 hours following surgery.     Slowly progress to your regular diet as you feel able. It's not unusual to feel nauseated and/or vomit after receiving anesthesia.  If you develop these symptoms, drink clear liquids (apple juice, ginger ale, broth, 7-up, etc. ) until you feel better.  If your nausea and vomiting persists for 24 hours, please notify your surgeon.      All narcotic pain medications, along with inactivity and anesthesia, can cause constipation. Drinking plenty of liquids and increasing fiber intake will help.    For any questions of a medical nature, call your surgeon.    Do not make important decisions for 24 hours.    If you had general anesthesia, you may have a sore throat for a couple of days related to the breathing tube used during surgery.  You may use Cepacol lozenges to help with this discomfort.  If it worsens or if you develop a fever, contact your surgeon.     If you feel your pain is not well managed with the pain medications prescribed by your surgeon, please contact your surgeon's office to let them know so they can address your concerns.       Today you were given 975 mg of Tylenol at 2:50 PM. The  recommended daily maximum dose is 4000 mg.       Today you received Toradol, an antiinflammatory medication similar to Ibuprofen.  You should not take other antiinflammatory medication, such as Ibuprofen, Motrin, Advil, Aleve, Naprosyn, etc until 12 midnight.             POSTOPERATIVE INSTRUCTIONS    Diagnosis-------------------------------   Left kidney stones    Procedure-------------------------------  Procedure(s) (LRB):  CYSTOSCOPY, LEFT RETROGRADE PYELOGRAM, LEFT URETEROSCOPY WITH LASER LITHOTRIOPSY AND BASKET REMOVAL OF STONES, LEFT URETERAL STENT PLACEMENT (Left)      Findings--------------------------------  Left kidney stones removed    Home-going instructions-----------------         Activity Limitation:     - No driving or operating heavy machinery while on narcotic pain medication.     FOLLOW THESE INSTRUCTIONS AS INDICATED BELOW:  - Observe operative area for signs of excessive bleeding.  - You may shower.  - Increase fluid intake to promote clear urine.  - Resume usual diet as tolerated    What to expect while recovering-----------  - You may experience some intermittent bleeding that makes your urine pink or cherry colored. This is normal.  - However, if you are unable to urinate, passing large amount of clots, have desi blood in your urine, or have a temperature >101 degrees, call the urology nurse on call, or present to your nearest emergency department.  - You are encouraged to walk daily, and have no activity restrictions.   - A URETERAL STENT has been placed that allows urine to flow unobstructed from your kidney into your bladder.  The stent has a curl in the kidney and a curl in the bladder.  The curl in the bladder can cause some urgency and frequency of urination as well as some mild blood in the urine.  The curl in the kidney can cause some mild flank discomfort.  This may be more noticeable when you urinate.  A URETERAL STENT is meant to be left in temporarily.  It must be removed or  changed no later than 3 months after it's insertion.  If it's not removed it can result in stone overgrowth on the stent that can cause pain, infection, and can be very difficult to remove.      Discharge Medications/instructions:   - Flomax (tamsulosin) to be taken daily until stent is removed  - Oxybutynin 5mg XL (Ditropan XL) to be taken daily until ureteral stent is removed  - Take Tylenol 1000mg every 6 hours for pain  - Take Ibuprofen 600mg every 6 hours as needed for additional pain control  - Take Oxycodone 5mg every 4-6 hours only for break through pain  - Take Colace while taking Oxycodone to prevent constipation      Questions/concerns------------------------  [facility]: [phone]    Future appointments  Follow-up appointment scheduled for 7/19/2025 for stent removal    Michael Erazo MD       **If you have questions or concerns about your procedure,   call Dr. Erazo at [phone]**

## 2025-07-11 NOTE — OP NOTE
OPERATIVE REPORT  DATE OF SURGERY: 07/11/25  LOCATION OF SURGERY: [facility] OR  PREOPERATIVE DIAGNOSIS:  (N20.0) Calculus of left kidney  (primary encounter diagnosis)  POSTOPERATIVE DIAGNOSIS: (N20.0) Calculus of left kidney  (primary encounter diagnosis)     START TIME: 5:31 PM  END TIME: 5:56 PM    PROCEDURE PERFORMED:   1. Cystoscopy  2. LEFT retrograde pyelogram  3. LEFT ureteroscopy with laser lithotripsy  4. LEFT ureteroscopy with basketing of stones  5. LEFT JJ stent placement  6. <1hr physician fluoroscopy time      STAFF SURGEON: Michael Erazo MD  ANESTHESIA: General.   ESTIMATED BLOOD LOSS: 2 mL.   DRAINS AND TUBES: LEFT 6fr x 22cm ureteral stent  COMPLICATIONS: None.   DISPOSITION: PACU.   SPECIMENS OBTAINED:   ID Type Source Tests Collected by Time Destination   A : Left Kidney Stone Calculus/Stone Kidney, Left STONE ANALYSIS Michael Erazo MD 7/11/2025  5:43 PM       SIGNIFICANT FINDINGS: Cystoscopy with no evidence of stones in the bladder.  Left ureteroscopy with no evidence of stones in the ureter and retrograde pyelogram outlining the collecting system.  Complete visualization of the entire intrarenal collecting system with 1 small stone noted and basketed and removed.  2 papilla with embedded stones which were lasered dusted.  No evidence of ureteral injury.     HISTORY OF PRESENT ILLNESS: Nora Vargas is a 59 year old female with a history of bilateral cyst with intermittent pain which is worse on the left.  Recent CT scan with multiple left kidney stones.  She was counseled on treatment options and elects to proceed with surgery    OPERATION PERFORMED:   Informed consent was obtained and the patient was brought to the operating room where general anesthesia was induced. The patient was given appropriate preoperative antibiotics and positioned supine. The patient was then repositioned in dorsal lithotomy with all pressure points padded.  We then performed a timeout, verifying the correct  patient's site and procedure to be performed.    A 22 Cuban cystoscope was inserted atraumatically into the bladder.  Cystoscopy was performed with no evidence of stones in the bladder.  The left ureteral orifice was identified and cannulated with a 0.035 sensor wire which was advanced up to the renal pelvis under fluoroscopic guidance and the scope was removed.  A semirigid ureteroscope was assembled and inserted atraumatically into the bladder.  Using an Amplatz Super Stiff wire the ureteral orifice was cannulated and the ureteroscope was advanced using a railroad technique up to the proximal ureter.  There is no evidence of stones in the ureter.  A gentle retrograde pyelogram was performed outlining the collecting system.  The Super Stiff wire was advanced to the renal pelvis and the semirigid ureteroscope was removed.  An 11-13 Cuban by 36 cm ureteral access sheath was advanced over the stiff wire up to the proximal ureter/UPJ and the inner stylette and wire were removed.  Flexible ureteroscope was advanced into the renal pelvis and complete pyeloscopy was performed with excellent visualization of the entire collecting system.  There was a small 3 mm stone noted in a lower pole calyx which was basketed and removed intact.  A few other submillimeter stone fragments were identified, however no evidence of the potential obstructing calyceal stones as noted on CT imaging.  There is no evidence to suggest any on visualized calyces.  There were 2 papilla with embedded stones beneath the layer of tissue in the mid and lower poles of the kidney.  These were laser unroofed and dusted.  The ureteroscope and access sheath were then removed en bloc with no evidence of ureteral injury.  A 6 Cuban by 22 cm JJ ureteral stent was advanced over the wire with good curl noted in the renal pelvis and in the bladder fluoroscopically.  The bladder was drained.  She received 15 mg IV Toradol.  She was emerged from anesthesia and  taken to the recovery room in stable condition.    Michael Erazo MD   Urology  [facility]  Clinic Phone [phone]

## 2025-07-12 NOTE — OR NURSING
Pt dressed, up in recliner and transported to Phase 2. Up to BR voids pink X1- [id number] sats >92% RA- Good PO intake, Denies c/o- Pt and responsible adult verbalize understanding of discharge instructions, denies questions. Up in W/C - transported to door for discharge to home.

## 2025-07-16 LAB
APPEARANCE STONE: NORMAL
COMPN STONE: NORMAL
SPECIMEN WT: 5 MG

## 2025-07-21 ENCOUNTER — PATIENT OUTREACH (OUTPATIENT)
Dept: CARE COORDINATION | Facility: CLINIC | Age: 60
End: 2025-07-21
Payer: MEDICARE

## 2025-07-23 ENCOUNTER — PATIENT OUTREACH (OUTPATIENT)
Dept: CARE COORDINATION | Facility: CLINIC | Age: 60
End: 2025-07-23
Payer: MEDICARE

## 2025-08-12 ENCOUNTER — MYC MEDICAL ADVICE (OUTPATIENT)
Dept: ENDOCRINOLOGY | Facility: CLINIC | Age: 60
End: 2025-08-12
Payer: MEDICARE

## 2025-08-12 DIAGNOSIS — M81.8 OTHER OSTEOPOROSIS WITHOUT CURRENT PATHOLOGICAL FRACTURE: Primary | ICD-10-CM

## 2025-08-12 DIAGNOSIS — E03.9 HYPOTHYROIDISM, UNSPECIFIED TYPE: ICD-10-CM

## (undated) DEVICE — BASKET STONE RETRIEVAL NTNL ZERO TIP 1.9FRX90CM M0063901050

## (undated) DEVICE — CATH URETERAL TIGERTAIL 05FR 70CM 139005

## (undated) DEVICE — CVAC ASPIRATION SYSTEM CVC127020-1

## (undated) DEVICE — PUMP SYSTEM SINGLE ACTION M0067201000

## (undated) DEVICE — CATH URETERAL OPEN END 6FR AXXCESS

## (undated) DEVICE — SOLUTION IV IRRIGATION 0.9% NACL 3L R8206

## (undated) DEVICE — PACK TUR CUSTOM SBA15RUFSE

## (undated) DEVICE — SOL NACL 0.9% IRRIG 3000ML BAG 2B7477

## (undated) DEVICE — GUIDEWIRE SENSOR DUAL FLEX STR 0.035"X150CM M0066703080

## (undated) DEVICE — PAD CHUX UNDERPAD 23X24" 7136

## (undated) DEVICE — SYR 20ML LL W/O NDL 302830

## (undated) DEVICE — BAG DRAIN URO FOR SIEMENS 8MM ADAPTER NS CC164NS-A

## (undated) DEVICE — GUIDEWIRE AMPLATZ SUPER STIFF .035 X 145CM M0066401080

## (undated) DEVICE — HOLMIUM LASER TECH

## (undated) DEVICE — LASER FIBER HOLMIUM MOSES 200 D/F/L AC-10030100

## (undated) DEVICE — DRSG TELFA 3X8" 1238

## (undated) DEVICE — CATH TRAY FOLEY SURESTEP 16FR W/URNE MTR STLK LATEX A303316A

## (undated) DEVICE — BASKET NITINOL TIPLESS HALO  1.5FRX120CM 554120

## (undated) DEVICE — ENDO SEAL BX PORT BPS-A

## (undated) DEVICE — SUCTION MANIFOLD NEPTUNE 2 SYS 4 PORT 0702-020-000

## (undated) DEVICE — RAD RX ISOVUE 300 (50ML) 61% IOPAMIDOL CHARGE PER ML

## (undated) DEVICE — LINEN TOWEL PACK X5 5464

## (undated) DEVICE — HOLMIUM LASER

## (undated) DEVICE — WIRE GUIDE AMPLATZ SUPER STIFF 0.035"X145CM 46-524

## (undated) DEVICE — TUBING SUCTION MEDI-VAC SOFT 3/16"X20' N520A

## (undated) DEVICE — SYSTEM IRR 70CM CVAC CATH CVAC70

## (undated) DEVICE — KIT TURNOVER FAIRVIEW SOUTHDALE HALF SP3890

## (undated) DEVICE — SHEATH URETERAL ACCESS NAVIGATOR HD 11/13FRX36CM M0062502220

## (undated) RX ORDER — KETOROLAC TROMETHAMINE 30 MG/ML
INJECTION, SOLUTION INTRAMUSCULAR; INTRAVENOUS
Status: DISPENSED
Start: 2024-10-04

## (undated) RX ORDER — KETOROLAC TROMETHAMINE 30 MG/ML
INJECTION, SOLUTION INTRAMUSCULAR; INTRAVENOUS
Status: DISPENSED
Start: 2024-09-18

## (undated) RX ORDER — FENTANYL CITRATE 50 UG/ML
INJECTION, SOLUTION INTRAMUSCULAR; INTRAVENOUS
Status: DISPENSED
Start: 2024-10-04

## (undated) RX ORDER — FENTANYL CITRATE 50 UG/ML
INJECTION, SOLUTION INTRAMUSCULAR; INTRAVENOUS
Status: DISPENSED
Start: 2025-07-11

## (undated) RX ORDER — DEXAMETHASONE SODIUM PHOSPHATE 4 MG/ML
INJECTION, SOLUTION INTRA-ARTICULAR; INTRALESIONAL; INTRAMUSCULAR; INTRAVENOUS; SOFT TISSUE
Status: DISPENSED
Start: 2025-07-11

## (undated) RX ORDER — FENTANYL CITRATE 50 UG/ML
INJECTION, SOLUTION INTRAMUSCULAR; INTRAVENOUS
Status: DISPENSED
Start: 2024-09-18

## (undated) RX ORDER — FUROSEMIDE 10 MG/ML
INJECTION INTRAMUSCULAR; INTRAVENOUS
Status: DISPENSED
Start: 2024-10-04

## (undated) RX ORDER — KETOROLAC TROMETHAMINE 30 MG/ML
INJECTION, SOLUTION INTRAMUSCULAR; INTRAVENOUS
Status: DISPENSED
Start: 2025-07-11

## (undated) RX ORDER — FUROSEMIDE 10 MG/ML
INJECTION INTRAMUSCULAR; INTRAVENOUS
Status: DISPENSED
Start: 2024-09-18

## (undated) RX ORDER — PROPOFOL 10 MG/ML
INJECTION, EMULSION INTRAVENOUS
Status: DISPENSED
Start: 2025-07-11

## (undated) RX ORDER — ACETAMINOPHEN 325 MG/1
TABLET ORAL
Status: DISPENSED
Start: 2025-07-11

## (undated) RX ORDER — ACETAMINOPHEN 325 MG/1
TABLET ORAL
Status: DISPENSED
Start: 2024-10-04

## (undated) RX ORDER — ONDANSETRON 2 MG/ML
INJECTION INTRAMUSCULAR; INTRAVENOUS
Status: DISPENSED
Start: 2025-07-11

## (undated) RX ORDER — ACETAMINOPHEN 325 MG/1
TABLET ORAL
Status: DISPENSED
Start: 2024-09-18